# Patient Record
Sex: FEMALE | Race: WHITE | NOT HISPANIC OR LATINO | Employment: FULL TIME | URBAN - METROPOLITAN AREA
[De-identification: names, ages, dates, MRNs, and addresses within clinical notes are randomized per-mention and may not be internally consistent; named-entity substitution may affect disease eponyms.]

---

## 2017-04-19 ENCOUNTER — TELEPHONE (OUTPATIENT)
Dept: ONCOLOGY | Facility: CLINIC | Age: 47
End: 2017-04-19

## 2017-04-19 RX ORDER — LEVOTHYROXINE SODIUM 137 UG/1
137 TABLET ORAL DAILY
Qty: 30 TABLET | Refills: 1 | Status: SHIPPED | OUTPATIENT
Start: 2017-04-19 | End: 2017-04-28 | Stop reason: SDUPTHER

## 2017-04-19 NOTE — TELEPHONE ENCOUNTER
----- Message from Yaritza Gray RN sent at 4/19/2017  9:39 AM EDT -----  Please call patient and schedule her 6mo f/u with Dr. Borja, she is aware you will be calling.

## 2017-04-19 NOTE — TELEPHONE ENCOUNTER
----- Message from Jefferson Lopez sent at 4/19/2017  9:28 AM EDT -----  Contact: self   Pt is calling to get a new refill on her synthroid   [phone]    Patient states she needs a refill on synthroid.  She states we always fill it.  No mention in Dr. Borja's dictation.  Patient due for 6 mo f/u visit.  Message sent to appt desk to call and schedule her 6 mo f/u.

## 2017-04-28 ENCOUNTER — OFFICE VISIT (OUTPATIENT)
Dept: ONCOLOGY | Facility: CLINIC | Age: 47
End: 2017-04-28

## 2017-04-28 ENCOUNTER — LAB (OUTPATIENT)
Dept: LAB | Facility: HOSPITAL | Age: 47
End: 2017-04-28

## 2017-04-28 VITALS
WEIGHT: 167 LBS | SYSTOLIC BLOOD PRESSURE: 110 MMHG | TEMPERATURE: 97.8 F | DIASTOLIC BLOOD PRESSURE: 72 MMHG | HEIGHT: 65 IN | HEART RATE: 72 BPM | RESPIRATION RATE: 16 BRPM | BODY MASS INDEX: 27.82 KG/M2

## 2017-04-28 DIAGNOSIS — C85.90 NON HODGKIN'S LYMPHOMA, STAGE IA (HCC): ICD-10-CM

## 2017-04-28 DIAGNOSIS — C85.90 NON HODGKIN'S LYMPHOMA, STAGE IA (HCC): Primary | ICD-10-CM

## 2017-04-28 LAB
ALBUMIN SERPL-MCNC: 4.5 G/DL (ref 3.5–5.2)
ALBUMIN/GLOB SERPL: 1.6 G/DL (ref 1.1–2.4)
ALP SERPL-CCNC: 72 U/L (ref 38–116)
ALT SERPL W P-5'-P-CCNC: 21 U/L (ref 0–33)
ANION GAP SERPL CALCULATED.3IONS-SCNC: 11.6 MMOL/L
AST SERPL-CCNC: 28 U/L (ref 0–32)
BASOPHILS # BLD AUTO: 0.04 10*3/MM3 (ref 0–0.1)
BASOPHILS NFR BLD AUTO: 0.7 % (ref 0–1.1)
BILIRUB SERPL-MCNC: 0.2 MG/DL (ref 0.1–1.2)
BUN BLD-MCNC: 17 MG/DL (ref 6–20)
BUN/CREAT SERPL: 21.3 (ref 7.3–30)
CALCIUM SPEC-SCNC: 9.7 MG/DL (ref 8.5–10.2)
CHLORIDE SERPL-SCNC: 97 MMOL/L (ref 98–107)
CO2 SERPL-SCNC: 30.4 MMOL/L (ref 22–29)
CREAT BLD-MCNC: 0.8 MG/DL (ref 0.6–1.1)
DEPRECATED RDW RBC AUTO: 42.5 FL (ref 37–49)
EOSINOPHIL # BLD AUTO: 0.08 10*3/MM3 (ref 0–0.36)
EOSINOPHIL NFR BLD AUTO: 1.3 % (ref 1–5)
ERYTHROCYTE [DISTWIDTH] IN BLOOD BY AUTOMATED COUNT: 12.4 % (ref 11.7–14.5)
GFR SERPL CREATININE-BSD FRML MDRD: 78 ML/MIN/1.73
GLOBULIN UR ELPH-MCNC: 2.9 GM/DL (ref 1.8–3.5)
GLUCOSE BLD-MCNC: 149 MG/DL (ref 74–124)
HCT VFR BLD AUTO: 38.8 % (ref 34–45)
HGB BLD-MCNC: 12.6 G/DL (ref 11.5–14.9)
IMM GRANULOCYTES # BLD: 0.01 10*3/MM3 (ref 0–0.03)
IMM GRANULOCYTES NFR BLD: 0.2 % (ref 0–0.5)
LYMPHOCYTES # BLD AUTO: 1.51 10*3/MM3 (ref 1–3.5)
LYMPHOCYTES NFR BLD AUTO: 24.7 % (ref 20–49)
MCH RBC QN AUTO: 30.3 PG (ref 27–33)
MCHC RBC AUTO-ENTMCNC: 32.5 G/DL (ref 32–35)
MCV RBC AUTO: 93.3 FL (ref 83–97)
MONOCYTES # BLD AUTO: 0.33 10*3/MM3 (ref 0.25–0.8)
MONOCYTES NFR BLD AUTO: 5.4 % (ref 4–12)
NEUTROPHILS # BLD AUTO: 4.14 10*3/MM3 (ref 1.5–7)
NEUTROPHILS NFR BLD AUTO: 67.7 % (ref 39–75)
NRBC BLD MANUAL-RTO: 0 /100 WBC (ref 0–0)
PLATELET # BLD AUTO: 224 10*3/MM3 (ref 150–375)
PMV BLD AUTO: 9.6 FL (ref 8.9–12.1)
POTASSIUM BLD-SCNC: 4.7 MMOL/L (ref 3.5–4.7)
PROT SERPL-MCNC: 7.4 G/DL (ref 6.3–8)
RBC # BLD AUTO: 4.16 10*6/MM3 (ref 3.9–5)
SODIUM BLD-SCNC: 139 MMOL/L (ref 134–145)
WBC NRBC COR # BLD: 6.11 10*3/MM3 (ref 4–10)

## 2017-04-28 PROCEDURE — 99213 OFFICE O/P EST LOW 20 MIN: CPT | Performed by: INTERNAL MEDICINE

## 2017-04-28 PROCEDURE — 80053 COMPREHEN METABOLIC PANEL: CPT | Performed by: INTERNAL MEDICINE

## 2017-04-28 PROCEDURE — 84443 ASSAY THYROID STIM HORMONE: CPT | Performed by: INTERNAL MEDICINE

## 2017-04-28 PROCEDURE — 85025 COMPLETE CBC W/AUTO DIFF WBC: CPT | Performed by: INTERNAL MEDICINE

## 2017-04-28 PROCEDURE — 36415 COLL VENOUS BLD VENIPUNCTURE: CPT | Performed by: INTERNAL MEDICINE

## 2017-04-28 RX ORDER — LEVOTHYROXINE SODIUM 137 UG/1
137 TABLET ORAL DAILY
Qty: 90 TABLET | Refills: 3 | Status: SHIPPED | OUTPATIENT
Start: 2017-04-28 | End: 2018-05-10 | Stop reason: SDUPTHER

## 2017-04-28 NOTE — PROGRESS NOTES
Subjective .  No additional symptoms, normal performance status    REASONS FOR FOLLOWUP:   1. 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   2. Recurrent disease, involving the left thyroid, biopsy-proven, PET scan reported hypermetabolism with left thyroid gland as well as a right neck lymph node.   3. Start chemotherapy with Treanda plus Rituxan on 03/21/2011. Had reaction to Rituxan infusion with significant skin rashes.   4. The patient [facility], doing well, with additional Treanda/Rituxan chemotherapy already planned.   5. The patient presents for a second cycle of Treanda/Rituxan with Rituxan moved to third day, followup PET planned.   6. Patient seen back post [place] review, 4-6 cycles Treanda/Rituxan planned, radiotherapy reconsultation planned, cycle #4 Treanda/Rituxan to be given with Treanda/Rituxan second day provided.   7. Followup review via [facility] er. Total of 6 cycles of planned Rituxan followed by consultation for radiotherapy and/or surgery. Followup thereafter without transplantation.   8. Patient seen 12/20/2011 with normal scans, followup reassessments planned. Note - a total of 5 courses of c hemotherapy were given.   9. Patient with followup studies performed on 04/17/2012; no evidence of recurrent disease.   10. CT scans 09/11/2012 with slight interval increase in sub-pathologic lymph nodes of the neck; 3-month interval scans planned.   11. Followup scans on 12/04/2012 showed no evidence of progressive disease, followup assessment in 6 months planned.   12. Followup scans in June 2013 without evidence of recurrent disease, increasing hot flashes noted, vitamin E trial advocated versus trial of Effexor, followup scans planned at 1 year with a 6-month review as well.   13. The patient seen 11/20/2013, stable with evidence of  perimenopause. Followup scans scheduled at 6 months, gynecologic assessment pursued.   14. Patient seen per GYN - postmenopausal status, followup sca ns without evidence of recurrent disease at 3 years, every-6-month followups through 5 years planned.   15. Patient status post ENT assessment with Dr. Jasen Dela Cruz with new neck node 10/13/2014, needle biopsy inconclusive, node removal consistent with reactive node only, 10/27/2014; assessments planned through our office thereafter.   16. Patient seen in office 07/08/2015, stable without any evidence of recurrent disease, every-6-month followup planned through year 5.   17. Patient seen in office 02/17/2016, stable; followup laboratory studies anticipated additional 6 months through the 5-year daisy; thyroid medication refilled; pneumococcal vaccine PPSV23 provided.   18. Patient seen August 03, 2016, stable clinically, laboratory studies-normal, Prevnar 13 provided  19. Patient reviewed April 28, 2017, stable without any evidence recurrence, yearly follow-up planned, thyroid medication refilled    History of Present Illness       The patient is a 46-year-old female with a history of recurrent non-Hodgkin lymphoma, status post therapy with Treanda/Rituxan x5 cycles radiation therapy, followed by observation. As she is seen back at 3 years, followup scans have now been done, which fortunately show no evidence o f recurrent disease. Recent CT of the neck, chest, abdomen and pelvis show a few sub centimeter neck nodes that are non-pathologically enlarged. As the patient is seen back, she had been last reviewed 11/20/2013, again, generally doing well. She had bee n developing perimenopausal symptoms and was referred back to her gynecologist, Dr. Russo.   As the patient was seen 05/07/2014, she was postmenopausal, handling this well, following with her gynecologist. Plans were made to follow again at a 6-month interv al. We were notified by her ENT physician that she had  developed a mid-cervical node 10/13/2014. This had been several cm in size and needle biopsy was done and found to be inconclusive. He contacted our practice and we requested removal of the node. Final path was consistent with reactive findings only.   As the patient returns today, 12/12/2014, she has not had any systemic symptoms and feels well overall, though one remains concerned about recurrent lymphoma.   As the patient was asked to be re-reviewed again, she presents now 07/08/2015. She is doing well without any additional issues since last reviewed.   The patient is now seen back 02/17/2016, feeling well overall. She has had review by ENT when there was thought to be a nodularity in her thyr oid, but this evidently did not turn out to be a serious problem. As she is seen today, she again has excellent performance status and no other new findings. We have discussed that she should obtain a flu shot and reviewed the pneumococcal vaccine as nury sagastume, hoping to provide PPSV23 here in our office today, and as she returns, Prevnar 13. We are hoping to provide PCV13.   The patient is next seen August 03, 2016.  She is just returned from a [place] trip with her family states that she is felt well with normal stamina, no fevers chills night sweats or other systemic symptoms.  Additionally she's had no development any thrombotic complications.    The patient is next seen April 28, 2017.  She continues to do well overall with excellent performance status.  She's had no additional medical issues since last seen and indicates that she does not have a primary care physician.  We plan to continue to see her yearly.  Past Medical History:   Diagnosis Date   • Antiphospholipid antibody syndrome 2000   • Endometriosis 2000   • Lymphoma of thyroid gland     Stage IE large B cell non-Hodgkin's lymphoma       ONCOLOGIC HISTORY:  (History from previous dates can be found in the separate document.)    Current Outpatient Prescriptions  "on File Prior to Visit   Medication Sig Dispense Refill   • Cholecalciferol (D3 ADULT PO) Take 2,000 Units by mouth.     • levothyroxine (SYNTHROID, LEVOTHROID) 137 MCG tablet Take 1 tablet by mouth Daily. 30 tablet 1   • VAGIFEM 10 MCG tablet vaginal tablet USE VAGINALLY 2-3 TIMES PER WEEK  5   • [DISCONTINUED] levothyroxine (SYNTHROID, LEVOTHROID) 137 MCG tablet Take  by mouth.       No current facility-administered medications on file prior to visit.        ALLERGIES:     Allergies   Allergen Reactions   • Morphine And Related    • Morphine Sulfate        Social History     Social History   • Marital status:      Spouse name: Aris   • Number of children: N/A   • Years of education: High School     Occupational History   •       Social History Main Topics   • Smoking status: Former Smoker     Packs/day: 1.00     Years: 17.00     Types: Cigarettes   • Smokeless tobacco: Not on file   • Alcohol use Yes      Comment: Occasional   • Drug use: No   • Sexual activity: Not on file     Other Topics Concern   • Not on file     Social History Narrative         Cancer-related family history includes Cancer in her maternal grandmother and other.     Review of Systems  A comprehensive 14 point review of systems was performed and was negative except as mentioned.    Objective      Vitals:    04/28/17 1604   BP: 110/72   Pulse: 72   Resp: 16   Temp: 97.8 °F (36.6 °C)   Weight: 167 lb (75.8 kg)   Height: 65.35\" (166 cm)  Comment: new ht.   PainSc: 0-No pain     Current Status 4/28/2017   ECOG score 0       Physical Exam    GENERAL: Well-developed, well-nourished female in no acute distress.   SKIN: Warm, dry without rashes, purpura or petechiae.   HEAD: Normocephalic.   EYES: Pupils equal, round and reactive to light. EOMs intact. Conjunctivae normal.   EARS: Hearing intact.   NOSE: Septum midline. No excoriations or nasal discharge.   MOUTH: Tongue is well-papillated; no stomatitis or ulcers. Lips normal. "   THROAT: Oropharynx without lesions or exudates.   NECK: Supple with good range of motion; no thyromegaly or masses, no JVD or bruits.   LYMPHATICS: No cervical, supraclavicular, axillary or inguinal adenopathy.   CHEST: Lungs clear to percussion and auscultation.   CARDIAC: Regular rate and rhythm without murmurs, rubs or gallops.   ABDOMEN: Soft, nontender with no organomegaly or masses.   EXTREMITIES: No clubbing, cyanosis or edema.   NEUROLOGICAL: No focal neurological deficits.     RECENT LABS:  Hematology WBC   Date Value Ref Range Status   04/28/2017 6.11 4.00 - 10.00 10*3/mm3 Final     RBC   Date Value Ref Range Status   04/28/2017 4.16 3.90 - 5.00 10*6/mm3 Final     Hemoglobin   Date Value Ref Range Status   04/28/2017 12.6 11.5 - 14.9 g/dL Final     Hematocrit   Date Value Ref Range Status   04/28/2017 38.8 34.0 - 45.0 % Final     Platelets   Date Value Ref Range Status   04/28/2017 224 150 - 375 10*3/mm3 Final        Assessment/Plan   1. This is a 46-year-old female with a history of recurrent non-Hodgkin lymphoma, status post therapy with Treanda/Rituxan x5 cycles and radiation therapy followed by observation. At approximately 5 years out from radiation therapy, she remains without evidence recurrent disease. We will plan to assess her in 12 months, at year 6, again with laboratory studies including CBC, CMP, sedimentation rate, lipid profile and TSH.

## 2017-05-01 LAB — TSH SERPL DL<=0.05 MIU/L-ACNC: 0.95 MIU/ML (ref 0.27–4.2)

## 2018-04-30 ENCOUNTER — OFFICE VISIT (OUTPATIENT)
Dept: ONCOLOGY | Facility: CLINIC | Age: 48
End: 2018-04-30

## 2018-04-30 ENCOUNTER — LAB (OUTPATIENT)
Dept: LAB | Facility: HOSPITAL | Age: 48
End: 2018-04-30

## 2018-04-30 VITALS
HEIGHT: 65 IN | TEMPERATURE: 97.9 F | RESPIRATION RATE: 16 BRPM | HEART RATE: 64 BPM | OXYGEN SATURATION: 100 % | BODY MASS INDEX: 28.49 KG/M2 | SYSTOLIC BLOOD PRESSURE: 112 MMHG | WEIGHT: 171 LBS | DIASTOLIC BLOOD PRESSURE: 64 MMHG

## 2018-04-30 DIAGNOSIS — C85.90 NON HODGKIN'S LYMPHOMA, STAGE IA (HCC): Primary | ICD-10-CM

## 2018-04-30 LAB
ALBUMIN SERPL-MCNC: 4.7 G/DL (ref 3.5–5.2)
ALBUMIN/GLOB SERPL: 1.4 G/DL (ref 1.1–2.4)
ALP SERPL-CCNC: 69 U/L (ref 38–116)
ALT SERPL W P-5'-P-CCNC: 21 U/L (ref 0–33)
ANION GAP SERPL CALCULATED.3IONS-SCNC: 9.9 MMOL/L
AST SERPL-CCNC: 30 U/L (ref 0–32)
BASOPHILS # BLD AUTO: 0.04 10*3/MM3 (ref 0–0.1)
BASOPHILS NFR BLD AUTO: 0.7 % (ref 0–1.1)
BILIRUB SERPL-MCNC: 0.2 MG/DL (ref 0.1–1.2)
BUN BLD-MCNC: 15 MG/DL (ref 6–20)
BUN/CREAT SERPL: 17.6 (ref 7.3–30)
CALCIUM SPEC-SCNC: 10.1 MG/DL (ref 8.5–10.2)
CHLORIDE SERPL-SCNC: 99 MMOL/L (ref 98–107)
CHOLEST SERPL-MCNC: 200 MG/DL (ref 0–200)
CO2 SERPL-SCNC: 32.1 MMOL/L (ref 22–29)
CREAT BLD-MCNC: 0.85 MG/DL (ref 0.6–1.1)
DEPRECATED RDW RBC AUTO: 42.9 FL (ref 37–49)
EOSINOPHIL # BLD AUTO: 0.08 10*3/MM3 (ref 0–0.36)
EOSINOPHIL NFR BLD AUTO: 1.4 % (ref 1–5)
ERYTHROCYTE [DISTWIDTH] IN BLOOD BY AUTOMATED COUNT: 12.7 % (ref 11.7–14.5)
ERYTHROCYTE [SEDIMENTATION RATE] IN BLOOD: 8 MM/HR (ref 0–20)
GFR SERPL CREATININE-BSD FRML MDRD: 72 ML/MIN/1.73
GLOBULIN UR ELPH-MCNC: 3.3 GM/DL (ref 1.8–3.5)
GLUCOSE BLD-MCNC: 87 MG/DL (ref 74–124)
HCT VFR BLD AUTO: 38.8 % (ref 34–45)
HDLC SERPL-MCNC: 117 MG/DL (ref 40–60)
HGB BLD-MCNC: 12.5 G/DL (ref 11.5–14.9)
IMM GRANULOCYTES # BLD: 0.03 10*3/MM3 (ref 0–0.03)
IMM GRANULOCYTES NFR BLD: 0.5 % (ref 0–0.5)
LDH SERPL-CCNC: 186 U/L (ref 99–259)
LDLC SERPL CALC-MCNC: 77 MG/DL (ref 0–100)
LDLC/HDLC SERPL: 0.66 {RATIO}
LYMPHOCYTES # BLD AUTO: 1.44 10*3/MM3 (ref 1–3.5)
LYMPHOCYTES NFR BLD AUTO: 25.4 % (ref 20–49)
MCH RBC QN AUTO: 29.9 PG (ref 27–33)
MCHC RBC AUTO-ENTMCNC: 32.2 G/DL (ref 32–35)
MCV RBC AUTO: 92.8 FL (ref 83–97)
MONOCYTES # BLD AUTO: 0.36 10*3/MM3 (ref 0.25–0.8)
MONOCYTES NFR BLD AUTO: 6.3 % (ref 4–12)
NEUTROPHILS # BLD AUTO: 3.73 10*3/MM3 (ref 1.5–7)
NEUTROPHILS NFR BLD AUTO: 65.7 % (ref 39–75)
NRBC BLD MANUAL-RTO: 0 /100 WBC (ref 0–0)
PLATELET # BLD AUTO: 250 10*3/MM3 (ref 150–375)
PMV BLD AUTO: 9.1 FL (ref 8.9–12.1)
POTASSIUM BLD-SCNC: 4.7 MMOL/L (ref 3.5–4.7)
PROT SERPL-MCNC: 8 G/DL (ref 6.3–8)
RBC # BLD AUTO: 4.18 10*6/MM3 (ref 3.9–5)
SODIUM BLD-SCNC: 141 MMOL/L (ref 134–145)
T4 FREE SERPL-MCNC: 1.43 NG/DL (ref 0.93–1.7)
TRIGL SERPL-MCNC: 29 MG/DL (ref 0–150)
TSH SERPL DL<=0.05 MIU/L-ACNC: 2.8 MIU/ML (ref 0.27–4.2)
VLDLC SERPL-MCNC: 5.8 MG/DL (ref 5–40)
WBC NRBC COR # BLD: 5.68 10*3/MM3 (ref 4–10)

## 2018-04-30 PROCEDURE — 80061 LIPID PANEL: CPT | Performed by: INTERNAL MEDICINE

## 2018-04-30 PROCEDURE — 85652 RBC SED RATE AUTOMATED: CPT | Performed by: INTERNAL MEDICINE

## 2018-04-30 PROCEDURE — 84439 ASSAY OF FREE THYROXINE: CPT | Performed by: INTERNAL MEDICINE

## 2018-04-30 PROCEDURE — 99213 OFFICE O/P EST LOW 20 MIN: CPT | Performed by: INTERNAL MEDICINE

## 2018-04-30 PROCEDURE — 85025 COMPLETE CBC W/AUTO DIFF WBC: CPT

## 2018-04-30 PROCEDURE — 83615 LACTATE (LD) (LDH) ENZYME: CPT

## 2018-04-30 PROCEDURE — 80053 COMPREHEN METABOLIC PANEL: CPT

## 2018-04-30 PROCEDURE — 84443 ASSAY THYROID STIM HORMONE: CPT | Performed by: INTERNAL MEDICINE

## 2018-04-30 PROCEDURE — 36415 COLL VENOUS BLD VENIPUNCTURE: CPT | Performed by: INTERNAL MEDICINE

## 2018-04-30 NOTE — PROGRESS NOTES
Subjective .  Again no additional symptoms, normal performance status    REASONS FOR FOLLOWUP:   1. 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   2. Recurrent disease, involving the left thyroid, biopsy-proven, PET scan reported hypermetabolism with left thyroid gland as well as a right neck lymph node.   3. Start chemotherapy with Treanda plus Rituxan on 03/21/2011. Had reaction to Rituxan infusion with significant skin rashes.   4. The [facility], doing well, with additional Treanda/Rituxan chemotherapy already planned.   5. The patient presents for a second cycle of Treanda/Rituxan with Rituxan moved to third day, followup PET planned.   6. Patient seen back post [place] review, 4-6 cycles Treanda/Rituxan planned, radiotherapy reconsultation planned, cycle #4 Treanda/Rituxan to be given with Treanda/Rituxan second day provided.   7. Followup review via [facility] er. Total of 6 cycles of planned Rituxan followed by consultation for radiotherapy and/or surgery. Followup thereafter without transplantation.   8. Patient seen 12/20/2011 with normal scans, followup reassessments planned. Note - a total of 5 courses of c hemotherapy were given.   9. Patient with followup studies performed on 04/17/2012; no evidence of recurrent disease.   10. CT scans 09/11/2012 with slight interval increase in sub-pathologic lymph nodes of the neck; 3-month interval scans planned.   11. Followup scans on 12/04/2012 showed no evidence of progressive disease, followup assessment in 6 months planned.   12. Followup scans in June 2013 without evidence of recurrent disease, increasing hot flashes noted, vitamin E trial advocated versus trial of Effexor, followup scans planned at 1 year with a 6-month review as well.   13. The patient seen 11/20/2013, stable with evidence  of perimenopause. Followup scans scheduled at 6 months, gynecologic assessment pursued.   14. Patient seen per GYN - postmenopausal status, followup sca ns without evidence of recurrent disease at 3 years, every-6-month followups through 5 years planned.   15. Patient status post ENT assessment with Dr. Jasen Dela Cruz with new neck node 10/13/2014, needle biopsy inconclusive, node removal consistent with reactive node only, 10/27/2014; assessments planned through our office thereafter.   16. Patient seen in office 07/08/2015, stable without any evidence of recurrent disease, every-6-month followup planned through year 5.   17. Patient seen in office 02/17/2016, stable; followup laboratory studies anticipated additional 6 months through the 5-year daisy; thyroid medication refilled; pneumococcal vaccine PPSV23 provided.   18. Patient seen August 03, 2016, stable clinically, laboratory studies-normal, Prevnar 13 provided  19. Patient reviewed April 28, 2017, stable without any evidence recurrence, yearly follow-up planned, thyroid medication refilled  20. Patient reviewed April 30, 2018, excellent performance status    History of Present Illness       The patient is a 47-year-old female with a history of recurrent non-Hodgkin lymphoma, status post therapy with Treanda/Rituxan x5 cycles radiation therapy, followed by observation. As she is seen back at 3 years, followup scans have now been done, which fortunately show no evidence o f recurrent disease. Recent CT of the neck, chest, abdomen and pelvis show a few sub centimeter neck nodes that are non-pathologically enlarged. As the patient is seen back, she had been last reviewed 11/20/2013, again, generally doing well. She had bee n developing perimenopausal symptoms and was referred back to her gynecologist, Dr. Russo.   As the patient was seen 05/07/2014, she was postmenopausal, handling this well, following with her gynecologist. Plans were made to follow again at a  6-month interv al. We were notified by her ENT physician that she had developed a mid-cervical node 10/13/2014. This had been several cm in size and needle biopsy was done and found to be inconclusive. He contacted our practice and we requested removal of the node. Final path was consistent with reactive findings only.   As the patient returns today, 12/12/2014, she has not had any systemic symptoms and feels well overall, though one remains concerned about recurrent lymphoma.   As the patient was asked to be re-reviewed again, she presents now 07/08/2015. She is doing well without any additional issues since last reviewed.   The patient is now seen back 02/17/2016, feeling well overall. She has had review by ENT when there was thought to be a nodularity in her thyr oid, but this evidently did not turn out to be a serious problem. As she is seen today, she again has excellent performance status and no other new findings. We have discussed that she should obtain a flu shot and reviewed the pneumococcal vaccine as w tanika, hoping to provide PPSV23 here in our office today, and as she returns, Prevnar 13. We are hoping to provide PCV13.   The patient is next seen August 03, 2016.  She is just returned from a [place] trip with her family states that she is felt well with normal stamina, no fevers chills night sweats or other systemic symptoms.  Additionally she's had no development any thrombotic complications.    The patient is next seen April 28, 2017.  She continues to do well overall with excellent performance status.  She's had no additional medical issues since last seen and indicates that she does not have a primary care physician.  We plan to continue to see her yearly.    The patient is next seen April 30, 2018.  She has done very well over the last year with no additional medical issues.  Past Medical History:   Diagnosis Date   • Antiphospholipid antibody syndrome 2000   • Endometriosis 2000   • Lymphoma of  "thyroid gland     Stage IE large B cell non-Hodgkin's lymphoma       ONCOLOGIC HISTORY:  (History from previous dates can be found in the separate document.)    Current Outpatient Prescriptions on File Prior to Visit   Medication Sig Dispense Refill   • Cholecalciferol (D3 ADULT PO) Take 2,000 Units by mouth.     • levothyroxine (SYNTHROID, LEVOTHROID) 137 MCG tablet Take 1 tablet by mouth Daily. 90 tablet 3   • VAGIFEM 10 MCG tablet vaginal tablet USE VAGINALLY 2-3 TIMES PER WEEK  5     No current facility-administered medications on file prior to visit.        ALLERGIES:     Allergies   Allergen Reactions   • Morphine And Related        Social History     Social History   • Marital status:      Spouse name: Aris   • Number of children: N/A   • Years of education: High School     Occupational History   •       Social History Main Topics   • Smoking status: Former Smoker     Packs/day: 1.00     Years: 17.00     Types: Cigarettes   • Smokeless tobacco: Not on file   • Alcohol use Yes      Comment: Occasional   • Drug use: No   • Sexual activity: Not on file     Other Topics Concern   • Not on file     Social History Narrative   • No narrative on file         Cancer-related family history includes Cancer in her maternal grandmother and other.     Review of Systems  A comprehensive 14 point review of systems was performed and was negative except as mentioned.    Objective      Vitals:    04/30/18 1409   BP: 112/64   Pulse: 64   Resp: 16   Temp: 97.9 °F (36.6 °C)   TempSrc: Oral   SpO2: 100%   Weight: 77.6 kg (171 lb)   Height: 166 cm (65.35\")   PainSc: 0-No pain     Current Status 4/30/2018   ECOG score 0       Physical Exam    GENERAL: Well-developed, well-nourished female in no acute distress.   SKIN: Warm, dry without rashes, purpura or petechiae.   HEAD: Normocephalic.   EYES: Pupils equal, round and reactive to light. EOMs intact. Conjunctivae normal.   EARS: Hearing intact.   NOSE: Septum " midline. No excoriations or nasal discharge.   MOUTH: Tongue is well-papillated; no stomatitis or ulcers. Lips normal.   THROAT: Oropharynx without lesions or exudates.   NECK: Supple with good range of motion; no thyromegaly or masses, no JVD or bruits.   LYMPHATICS: No cervical, supraclavicular, axillary or inguinal adenopathy.   CHEST: Lungs clear to percussion and auscultation.   CARDIAC: Regular rate and rhythm without murmurs, rubs or gallops.   ABDOMEN: Soft, nontender with no organomegaly or masses.   EXTREMITIES: No clubbing, cyanosis or edema.   NEUROLOGICAL: No focal neurological deficits.     RECENT LABS:  Hematology WBC   Date Value Ref Range Status   04/30/2018 5.68 4.00 - 10.00 10*3/mm3 Final     RBC   Date Value Ref Range Status   04/30/2018 4.18 3.90 - 5.00 10*6/mm3 Final     Hemoglobin   Date Value Ref Range Status   04/30/2018 12.5 11.5 - 14.9 g/dL Final     Hematocrit   Date Value Ref Range Status   04/30/2018 38.8 34.0 - 45.0 % Final     Platelets   Date Value Ref Range Status   04/30/2018 250 150 - 375 10*3/mm3 Final        Assessment/Plan   1. This is a 47-year-old female with a history of recurrent non-Hodgkin lymphoma, status post therapy with Treanda/Rituxan x5 cycles and radiation therapy followed by observation. At approximately 5 years out from radiation therapy, she remains without evidence recurrent disease. We will plan to assess her in 12 months, at year 7, again with laboratory studies including CBC, CMP, sedimentation rate, lipid profile and TSH.      Addendum-message left for patient on answering machine concerning normal laboratory testing.  I have asked her to call back for any questions.

## 2018-05-10 RX ORDER — LEVOTHYROXINE SODIUM 137 UG/1
TABLET ORAL
Qty: 90 TABLET | Refills: 1 | Status: SHIPPED | OUTPATIENT
Start: 2018-05-10 | End: 2018-09-28 | Stop reason: SDUPTHER

## 2018-09-28 ENCOUNTER — TELEPHONE (OUTPATIENT)
Dept: ONCOLOGY | Facility: HOSPITAL | Age: 48
End: 2018-09-28

## 2018-09-28 RX ORDER — LEVOTHYROXINE SODIUM 137 UG/1
137 TABLET ORAL DAILY
Qty: 90 TABLET | Refills: 0 | Status: SHIPPED | OUTPATIENT
Start: 2018-09-28 | End: 2019-02-10 | Stop reason: SDUPTHER

## 2018-09-28 NOTE — TELEPHONE ENCOUNTER
----- Message from Kaya Dwyer sent at 9/28/2018  9:42 AM EDT -----  Contact: [phone]  Pt needs a refill on Levothyrodine 137mcg called to [facility] at [id number] [facility].      Pt called wanting to get a refill on Levothyroxine. Escribed to her pharmacy.

## 2019-02-11 RX ORDER — LEVOTHYROXINE SODIUM 137 UG/1
TABLET ORAL
Qty: 90 TABLET | Refills: 0 | Status: SHIPPED | OUTPATIENT
Start: 2019-02-11 | End: 2019-05-09 | Stop reason: SDUPTHER

## 2019-05-02 ENCOUNTER — LAB (OUTPATIENT)
Dept: LAB | Facility: HOSPITAL | Age: 49
End: 2019-05-02

## 2019-05-02 DIAGNOSIS — C85.90 NON HODGKIN'S LYMPHOMA, STAGE IA (HCC): Primary | ICD-10-CM

## 2019-05-02 LAB
ALBUMIN SERPL-MCNC: 4.5 G/DL (ref 3.5–5.2)
ALBUMIN/GLOB SERPL: 1.4 G/DL (ref 1.1–2.4)
ALP SERPL-CCNC: 71 U/L (ref 38–116)
ALT SERPL W P-5'-P-CCNC: 19 U/L (ref 0–33)
ANION GAP SERPL CALCULATED.3IONS-SCNC: 13.7 MMOL/L
AST SERPL-CCNC: 29 U/L (ref 0–32)
BASOPHILS # BLD AUTO: 0.05 10*3/MM3 (ref 0–0.2)
BASOPHILS NFR BLD AUTO: 0.9 % (ref 0–1.5)
BILIRUB SERPL-MCNC: 0.3 MG/DL (ref 0.2–1.2)
BUN BLD-MCNC: 21 MG/DL (ref 6–20)
BUN/CREAT SERPL: 24.4 (ref 7.3–30)
CALCIUM SPEC-SCNC: 10.3 MG/DL (ref 8.5–10.2)
CHLORIDE SERPL-SCNC: 101 MMOL/L (ref 98–107)
CHOLEST SERPL-MCNC: 220 MG/DL (ref 0–200)
CO2 SERPL-SCNC: 25.3 MMOL/L (ref 22–29)
CREAT BLD-MCNC: 0.86 MG/DL (ref 0.6–1.1)
DEPRECATED RDW RBC AUTO: 43.1 FL (ref 37–54)
EOSINOPHIL # BLD AUTO: 0.09 10*3/MM3 (ref 0–0.4)
EOSINOPHIL NFR BLD AUTO: 1.6 % (ref 0.3–6.2)
ERYTHROCYTE [DISTWIDTH] IN BLOOD BY AUTOMATED COUNT: 12.6 % (ref 12.3–15.4)
ERYTHROCYTE [SEDIMENTATION RATE] IN BLOOD: 9 MM/HR (ref 0–20)
GFR SERPL CREATININE-BSD FRML MDRD: 71 ML/MIN/1.73
GLOBULIN UR ELPH-MCNC: 3.2 GM/DL (ref 1.8–3.5)
GLUCOSE BLD-MCNC: 82 MG/DL (ref 74–124)
HCT VFR BLD AUTO: 42.9 % (ref 34–46.6)
HDLC SERPL-MCNC: 130 MG/DL (ref 40–60)
HGB BLD-MCNC: 13.8 G/DL (ref 12–15.9)
IMM GRANULOCYTES # BLD AUTO: 0.02 10*3/MM3 (ref 0–0.05)
IMM GRANULOCYTES NFR BLD AUTO: 0.4 % (ref 0–0.5)
LDLC SERPL CALC-MCNC: 84 MG/DL (ref 0–100)
LDLC/HDLC SERPL: 0.64 {RATIO}
LYMPHOCYTES # BLD AUTO: 1.4 10*3/MM3 (ref 0.7–3.1)
LYMPHOCYTES NFR BLD AUTO: 25.3 % (ref 19.6–45.3)
MCH RBC QN AUTO: 29.9 PG (ref 26.6–33)
MCHC RBC AUTO-ENTMCNC: 32.2 G/DL (ref 31.5–35.7)
MCV RBC AUTO: 92.9 FL (ref 79–97)
MONOCYTES # BLD AUTO: 0.45 10*3/MM3 (ref 0.1–0.9)
MONOCYTES NFR BLD AUTO: 8.1 % (ref 5–12)
NEUTROPHILS # BLD AUTO: 3.52 10*3/MM3 (ref 1.7–7)
NEUTROPHILS NFR BLD AUTO: 63.7 % (ref 42.7–76)
NRBC BLD AUTO-RTO: 0 /100 WBC (ref 0–0.2)
PLATELET # BLD AUTO: 240 10*3/MM3 (ref 140–450)
PMV BLD AUTO: 9.2 FL (ref 6–12)
POTASSIUM BLD-SCNC: 4.7 MMOL/L (ref 3.5–4.7)
PROT SERPL-MCNC: 7.7 G/DL (ref 6.3–8)
RBC # BLD AUTO: 4.62 10*6/MM3 (ref 3.77–5.28)
SODIUM BLD-SCNC: 140 MMOL/L (ref 134–145)
TRIGL SERPL-MCNC: 31 MG/DL (ref 0–150)
TSH SERPL DL<=0.05 MIU/L-ACNC: 5.35 MIU/ML (ref 0.27–4.2)
VLDLC SERPL-MCNC: 6.2 MG/DL (ref 5–40)
WBC NRBC COR # BLD: 5.53 10*3/MM3 (ref 3.4–10.8)

## 2019-05-02 PROCEDURE — 85652 RBC SED RATE AUTOMATED: CPT | Performed by: INTERNAL MEDICINE

## 2019-05-02 PROCEDURE — 36415 COLL VENOUS BLD VENIPUNCTURE: CPT | Performed by: INTERNAL MEDICINE

## 2019-05-02 PROCEDURE — 80061 LIPID PANEL: CPT | Performed by: INTERNAL MEDICINE

## 2019-05-02 PROCEDURE — 84443 ASSAY THYROID STIM HORMONE: CPT | Performed by: INTERNAL MEDICINE

## 2019-05-02 PROCEDURE — 80053 COMPREHEN METABOLIC PANEL: CPT | Performed by: INTERNAL MEDICINE

## 2019-05-02 PROCEDURE — 85025 COMPLETE CBC W/AUTO DIFF WBC: CPT | Performed by: INTERNAL MEDICINE

## 2019-05-09 ENCOUNTER — OFFICE VISIT (OUTPATIENT)
Dept: ONCOLOGY | Facility: CLINIC | Age: 49
End: 2019-05-09

## 2019-05-09 ENCOUNTER — APPOINTMENT (OUTPATIENT)
Dept: ONCOLOGY | Facility: CLINIC | Age: 49
End: 2019-05-09

## 2019-05-09 ENCOUNTER — APPOINTMENT (OUTPATIENT)
Dept: LAB | Facility: HOSPITAL | Age: 49
End: 2019-05-09

## 2019-05-09 VITALS
BODY MASS INDEX: 29.82 KG/M2 | RESPIRATION RATE: 18 BRPM | HEIGHT: 65 IN | DIASTOLIC BLOOD PRESSURE: 77 MMHG | OXYGEN SATURATION: 99 % | SYSTOLIC BLOOD PRESSURE: 123 MMHG | HEART RATE: 64 BPM | WEIGHT: 179 LBS | TEMPERATURE: 98.3 F

## 2019-05-09 DIAGNOSIS — C85.90 NON HODGKIN'S LYMPHOMA, STAGE IA (HCC): Primary | ICD-10-CM

## 2019-05-09 PROCEDURE — 99213 OFFICE O/P EST LOW 20 MIN: CPT | Performed by: INTERNAL MEDICINE

## 2019-05-09 RX ORDER — LEVOTHYROXINE SODIUM 0.15 MG/1
150 TABLET ORAL DAILY
Qty: 90 TABLET | Refills: 3 | Status: SHIPPED | OUTPATIENT
Start: 2019-05-09 | End: 2020-05-27

## 2019-05-09 NOTE — PROGRESS NOTES
Subjective .  Again no additional symptoms, normal performance status    REASONS FOR FOLLOWUP:   1. 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   2. Recurrent disease, involving the left thyroid, biopsy-proven, PET scan reported hypermetabolism with left thyroid gland as well as a right neck lymph node.   3. Start chemotherapy with Treanda plus Rituxan on 03/21/2011. Had reaction to Rituxan infusion with significant skin rashes.   4. The patient [facility], doing well, with additional Treanda/Rituxan chemotherapy already planned.   5. The patient presents for a second cycle of Treanda/Rituxan with Rituxan moved to third day, followup PET planned.   6. Patient seen back post [place] review, 4-6 cycles Treanda/Rituxan planned, radiotherapy reconsultation planned, cycle #4 Treanda/Rituxan to be given with Treanda/Rituxan second day provided.   7. Followup review via [facility] er. Total of 6 cycles of planned Rituxan followed by consultation for radiotherapy and/or surgery. Followup thereafter without transplantation.   8. Patient seen 12/20/2011 with normal scans, followup reassessments planned. Note - a total of 5 courses of c hemotherapy were given.   9. Patient with followup studies performed on 04/17/2012; no evidence of recurrent disease.   10. CT scans 09/11/2012 with slight interval increase in sub-pathologic lymph nodes of the neck; 3-month interval scans planned.   11. Followup scans on 12/04/2012 showed no evidence of progressive disease, followup assessment in 6 months planned.   12. Followup scans in June 2013 without evidence of recurrent disease, increasing hot flashes noted, vitamin E trial advocated versus trial of Effexor, followup scans planned at 1 year with a 6-month review as well.   13. The patient seen 11/20/2013, stable with evidence  of perimenopause. Followup scans scheduled at 6 months, gynecologic assessment pursued.   14. Patient seen per GYN - postmenopausal status, followup sca ns without evidence of recurrent disease at 3 years, every-6-month followups through 5 years planned.   15. Patient status post ENT assessment with Dr. Jasen Dela Cruz with new neck node 10/13/2014, needle biopsy inconclusive, node removal consistent with reactive node only, 10/27/2014; assessments planned through our office thereafter.   16. Patient seen in office 07/08/2015, stable without any evidence of recurrent disease, every-6-month followup planned through year 5.   17. Patient seen in office 02/17/2016, stable; followup laboratory studies anticipated additional 6 months through the 5-year daisy; thyroid medication refilled; pneumococcal vaccine PPSV23 provided.   18. Patient seen August 03, 2016, stable clinically, laboratory studies-normal, Prevnar 13 provided  19. Patient reviewed April 28, 2017, stable without any evidence recurrence, yearly follow-up planned, thyroid medication refilled  20. Patient reviewed April 30, 2018, excellent performance status  21. Patient reviewed May 9, 2019 stable, adjustment of Synthroid    History of Present Illness       The patient is a 48 year-old female with a history of recurrent non-Hodgkin lymphoma, status post therapy with Treanda/Rituxan x5 cycles radiation therapy, followed by observation. As she is seen back at 3 years, followup scans have now been done, which fortunately show no evidence o f recurrent disease. Recent CT of the neck, chest, abdomen and pelvis show a few sub centimeter neck nodes that are non-pathologically enlarged. As the patient is seen back, she had been last reviewed 11/20/2013, again, generally doing well. She had bee n developing perimenopausal symptoms and was referred back to her gynecologist, Dr. Russo.   As the patient was seen 05/07/2014, she was postmenopausal, handling this well,  following with her gynecologist. Plans were made to follow again at a 6-month interv al. We were notified by her ENT physician that she had developed a mid-cervical node 10/13/2014. This had been several cm in size and needle biopsy was done and found to be inconclusive. He contacted our practice and we requested removal of the node. Final path was consistent with reactive findings only.   As the patient returns today, 12/12/2014, she has not had any systemic symptoms and feels well overall, though one remains concerned about recurrent lymphoma.   As the patient was asked to be re-reviewed again, she presents now 07/08/2015. She is doing well without any additional issues since last reviewed.   The patient is now seen back 02/17/2016, feeling well overall. She has had review by ENT when there was thought to be a nodularity in her thyr oid, but this evidently did not turn out to be a serious problem. As she is seen today, she again has excellent performance status and no other new findings. We have discussed that she should obtain a flu shot and reviewed the pneumococcal vaccine as w tanika, hoping to provide PPSV23 here in our office today, and as she returns, Prevnar 13. We are hoping to provide PCV13.   The patient is next seen August 03, 2016.  She is just returned from a [place] trip with her family states that she is felt well with normal stamina, no fevers chills night sweats or other systemic symptoms.  Additionally she's had no development any thrombotic complications.    The patient is next seen April 28, 2017.  She continues to do well overall with excellent performance status.  She's had no additional medical issues since last seen and indicates that she does not have a primary care physician.  We plan to continue to see her yearly.    The patient is next seen April 30, 2018.  She has done very well over the last year with no additional medical issues.  The patient is next seen May 9, 2019 continue to do  "well overall.  She has not yet been able to find primary care.  We continue to assess her yearly.  Past Medical History:   Diagnosis Date   • Antiphospholipid antibody syndrome (CMS/HCC) 2000   • Endometriosis 2000   • Lymphoma of thyroid gland (CMS/HCC)     Stage IE large B cell non-Hodgkin's lymphoma       ONCOLOGIC HISTORY:  (History from previous dates can be found in the separate document.)    Current Outpatient Medications on File Prior to Visit   Medication Sig Dispense Refill   • Cholecalciferol (D3 ADULT PO) Take 2,000 Units by mouth.     • levothyroxine (SYNTHROID, LEVOTHROID) 137 MCG tablet TAKE ONE TABLET BY MOUTH DAILY 90 tablet 0   • VAGIFEM 10 MCG tablet vaginal tablet USE VAGINALLY 2-3 TIMES PER WEEK  5     No current facility-administered medications on file prior to visit.        ALLERGIES:     Allergies   Allergen Reactions   • Morphine And Related        Social History     Socioeconomic History   • Marital status:      Spouse name: Aris   • Number of children: Not on file   • Years of education: High School   • Highest education level: Not on file   Occupational History   • Occupation:    Tobacco Use   • Smoking status: Former Smoker     Packs/day: 1.00     Years: 17.00     Pack years: 17.00     Types: Cigarettes   Substance and Sexual Activity   • Alcohol use: Yes     Comment: Occasional   • Drug use: No         Cancer-related family history includes Cancer in her maternal grandmother and other.     Review of Systems   Constitutional: Negative for fatigue.   Respiratory: Negative for chest tightness, shortness of breath and wheezing.    Gastrointestinal: Negative for abdominal pain, constipation, diarrhea, nausea and vomiting.   Neurological: Negative for weakness.     Objective      Vitals:    05/09/19 1403   BP: 123/77   Pulse: 64   Resp: 18   Temp: 98.3 °F (36.8 °C)   TempSrc: Oral   SpO2: 99%   Weight: 81.2 kg (179 lb)   Height: 166 cm (65.35\")  Comment: new yearly " height   PainSc: 0-No pain     Current Status 5/9/2019   ECOG score 0       Physical Exam    GENERAL: Well-developed, well-nourished female in no acute distress.   SKIN: Warm, dry without rashes, purpura or petechiae.   HEAD: Normocephalic.   EYES: Pupils equal, round and reactive to light. EOMs intact. Conjunctivae normal.   EARS: Hearing intact.   NOSE: Septum midline. No excoriations or nasal discharge.   MOUTH: Tongue is well-papillated; no stomatitis or ulcers. Lips normal.   THROAT: Oropharynx without lesions or exudates.   NECK: Supple with good range of motion; no thyromegaly or masses, no JVD or bruits.   LYMPHATICS: No cervical, supraclavicular, axillary or inguinal adenopathy.   CHEST: Lungs clear to percussion and auscultation.   CARDIAC: Regular rate and rhythm without murmurs, rubs or gallops.   ABDOMEN: Soft, nontender with no organomegaly or masses.   EXTREMITIES: No clubbing, cyanosis or edema.   NEUROLOGICAL: No focal neurological deficits.     RECENT LABS:  Hematology WBC   Date Value Ref Range Status   05/02/2019 5.53 3.40 - 10.80 10*3/mm3 Final     RBC   Date Value Ref Range Status   05/02/2019 4.62 3.77 - 5.28 10*6/mm3 Final     Hemoglobin   Date Value Ref Range Status   05/02/2019 13.8 12.0 - 15.9 g/dL Final     Hematocrit   Date Value Ref Range Status   05/02/2019 42.9 34.0 - 46.6 % Final     Platelets   Date Value Ref Range Status   05/02/2019 240 140 - 450 10*3/mm3 Final        Assessment/Plan   1. This is a 48-year-old female with a history of recurrent non-Hodgkin lymphoma, status post therapy with Treanda/Rituxan x5 cycles and radiation therapy followed by observation. At approximately 5 years out from radiation therapy, she remains without evidence recurrent disease.  As she is seen May 9, 2019 8-year 7 she is doing well we will adjust her Synthroid mildly to address her elevated TSH.  We will plan to assess her in 12 months, at year 8 again with laboratory studies including CBC, CMP,  sedimentation rate, lipid profile and TSH.

## 2019-05-15 ENCOUNTER — APPOINTMENT (OUTPATIENT)
Dept: LAB | Facility: HOSPITAL | Age: 49
End: 2019-05-15

## 2019-05-15 ENCOUNTER — APPOINTMENT (OUTPATIENT)
Dept: ONCOLOGY | Facility: CLINIC | Age: 49
End: 2019-05-15

## 2020-05-21 ENCOUNTER — TELEPHONE (OUTPATIENT)
Dept: ONCOLOGY | Facility: CLINIC | Age: 50
End: 2020-05-21

## 2020-05-27 ENCOUNTER — TELEPHONE (OUTPATIENT)
Dept: ONCOLOGY | Facility: CLINIC | Age: 50
End: 2020-05-27

## 2020-05-27 RX ORDER — LEVOTHYROXINE SODIUM 0.15 MG/1
TABLET ORAL
Qty: 30 TABLET | Refills: 0 | Status: SHIPPED | OUTPATIENT
Start: 2020-05-27 | End: 2020-07-22

## 2020-05-27 NOTE — TELEPHONE ENCOUNTER
----- Message from Kym Casanova sent at 5/27/2020  1:21 PM EDT -----  Please call pt to schedule appt. There is a note in the profile from the HUB with a current phone #. Thanks Kym.

## 2020-06-17 ENCOUNTER — LAB (OUTPATIENT)
Dept: LAB | Facility: HOSPITAL | Age: 50
End: 2020-06-17

## 2020-06-17 DIAGNOSIS — C85.90 NON HODGKIN'S LYMPHOMA, STAGE IA (HCC): Primary | ICD-10-CM

## 2020-06-17 LAB
ALBUMIN SERPL-MCNC: 4.7 G/DL (ref 3.5–5.2)
ALBUMIN/GLOB SERPL: 1.5 G/DL (ref 1.1–2.4)
ALP SERPL-CCNC: 65 U/L (ref 38–116)
ALT SERPL W P-5'-P-CCNC: 23 U/L (ref 0–33)
ANION GAP SERPL CALCULATED.3IONS-SCNC: 11.2 MMOL/L (ref 5–15)
AST SERPL-CCNC: 30 U/L (ref 0–32)
BASOPHILS # BLD AUTO: 0.04 10*3/MM3 (ref 0–0.2)
BASOPHILS NFR BLD AUTO: 0.8 % (ref 0–1.5)
BILIRUB SERPL-MCNC: 0.4 MG/DL (ref 0.2–1.2)
BUN BLD-MCNC: 19 MG/DL (ref 6–20)
BUN/CREAT SERPL: 23.2 (ref 7.3–30)
CALCIUM SPEC-SCNC: 10.5 MG/DL (ref 8.5–10.2)
CHLORIDE SERPL-SCNC: 100 MMOL/L (ref 98–107)
CHOLEST SERPL-MCNC: 191 MG/DL (ref 0–200)
CO2 SERPL-SCNC: 27.8 MMOL/L (ref 22–29)
CREAT BLD-MCNC: 0.82 MG/DL (ref 0.6–1.1)
DEPRECATED RDW RBC AUTO: 45 FL (ref 37–54)
EOSINOPHIL # BLD AUTO: 0.14 10*3/MM3 (ref 0–0.4)
EOSINOPHIL NFR BLD AUTO: 2.8 % (ref 0.3–6.2)
ERYTHROCYTE [DISTWIDTH] IN BLOOD BY AUTOMATED COUNT: 13.2 % (ref 12.3–15.4)
ERYTHROCYTE [SEDIMENTATION RATE] IN BLOOD: 10 MM/HR (ref 0–20)
GFR SERPL CREATININE-BSD FRML MDRD: 74 ML/MIN/1.73
GLOBULIN UR ELPH-MCNC: 3.1 GM/DL (ref 1.8–3.5)
GLUCOSE BLD-MCNC: 83 MG/DL (ref 74–124)
HCT VFR BLD AUTO: 40.9 % (ref 34–46.6)
HDLC SERPL-MCNC: 105 MG/DL (ref 40–60)
HGB BLD-MCNC: 13 G/DL (ref 12–15.9)
IMM GRANULOCYTES # BLD AUTO: 0.01 10*3/MM3 (ref 0–0.05)
IMM GRANULOCYTES NFR BLD AUTO: 0.2 % (ref 0–0.5)
LDLC SERPL CALC-MCNC: 76 MG/DL (ref 0–100)
LDLC/HDLC SERPL: 0.72 {RATIO}
LYMPHOCYTES # BLD AUTO: 1.39 10*3/MM3 (ref 0.7–3.1)
LYMPHOCYTES NFR BLD AUTO: 27.9 % (ref 19.6–45.3)
MCH RBC QN AUTO: 29.4 PG (ref 26.6–33)
MCHC RBC AUTO-ENTMCNC: 31.8 G/DL (ref 31.5–35.7)
MCV RBC AUTO: 92.5 FL (ref 79–97)
MONOCYTES # BLD AUTO: 0.45 10*3/MM3 (ref 0.1–0.9)
MONOCYTES NFR BLD AUTO: 9 % (ref 5–12)
NEUTROPHILS # BLD AUTO: 2.95 10*3/MM3 (ref 1.7–7)
NEUTROPHILS NFR BLD AUTO: 59.3 % (ref 42.7–76)
NRBC BLD AUTO-RTO: 0 /100 WBC (ref 0–0.2)
PLATELET # BLD AUTO: 247 10*3/MM3 (ref 140–450)
PMV BLD AUTO: 9.5 FL (ref 6–12)
POTASSIUM BLD-SCNC: 4.8 MMOL/L (ref 3.5–4.7)
PROT SERPL-MCNC: 7.8 G/DL (ref 6.3–8)
RBC # BLD AUTO: 4.42 10*6/MM3 (ref 3.77–5.28)
SODIUM BLD-SCNC: 139 MMOL/L (ref 134–145)
TRIGL SERPL-MCNC: 52 MG/DL (ref 0–150)
TSH SERPL DL<=0.05 MIU/L-ACNC: 1.17 UIU/ML (ref 0.27–4.2)
VLDLC SERPL-MCNC: 10.4 MG/DL (ref 5–40)
WBC NRBC COR # BLD: 4.98 10*3/MM3 (ref 3.4–10.8)

## 2020-06-17 PROCEDURE — 36415 COLL VENOUS BLD VENIPUNCTURE: CPT

## 2020-06-17 PROCEDURE — 85652 RBC SED RATE AUTOMATED: CPT | Performed by: INTERNAL MEDICINE

## 2020-06-17 PROCEDURE — 80053 COMPREHEN METABOLIC PANEL: CPT

## 2020-06-17 PROCEDURE — 85025 COMPLETE CBC W/AUTO DIFF WBC: CPT

## 2020-06-17 PROCEDURE — 80061 LIPID PANEL: CPT | Performed by: INTERNAL MEDICINE

## 2020-06-17 PROCEDURE — 84443 ASSAY THYROID STIM HORMONE: CPT | Performed by: INTERNAL MEDICINE

## 2020-06-17 NOTE — PROGRESS NOTES
Subjective .  Again no additional symptoms, normal performance status, contacted by telephone after video visit was unsuccessful.  She agrees to telephone contact.    REASONS FOR FOLLOWUP:   1. 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   2. Recurrent disease, involving the left thyroid, biopsy-proven, PET scan reported hypermetabolism with left thyroid gland as well as a right neck lymph node.   3. Start chemotherapy with Treanda plus Rituxan on 03/21/2011. Had reaction to Rituxan infusion with significant skin rashes.   4. The [facility], doing well, with additional Treanda/Rituxan chemotherapy already planned.   5. The patient presents for a second cycle of Treanda/Rituxan with Rituxan moved to third day, followup PET planned.   6. Patient seen back post [place] review, 4-6 cycles Treanda/Rituxan planned, radiotherapy reconsultation planned, cycle #4 Treanda/Rituxan to be given with Treanda/Rituxan second day provided.   7. Followup review via [facility] er. Total of 6 cycles of planned Rituxan followed by consultation for radiotherapy and/or surgery. Followup thereafter without transplantation.   8. Patient seen 12/20/2011 with normal scans, followup reassessments planned. Note - a total of 5 courses of c hemotherapy were given.   9. Patient with followup studies performed on 04/17/2012; no evidence of recurrent disease.   10. CT scans 09/11/2012 with slight interval increase in sub-pathologic lymph nodes of the neck; 3-month interval scans planned.   11. Followup scans on 12/04/2012 showed no evidence of progressive disease, followup assessment in 6 months planned.   12. Followup scans in June 2013 without evidence of recurrent disease, increasing hot flashes noted, vitamin E trial advocated versus trial of Effexor, followup scans planned at  1 year with a 6-month review as well.   13. The patient seen 11/20/2013, stable with evidence of perimenopause. Followup scans scheduled at 6 months, gynecologic assessment pursued.   14. Patient seen per GYN - postmenopausal status, followup sca ns without evidence of recurrent disease at 3 years, every-6-month followups through 5 years planned.   15. Patient status post ENT assessment with Dr. Jasen Dela Cruz with new neck node 10/13/2014, needle biopsy inconclusive, node removal consistent with reactive node only, 10/27/2014; assessments planned through our office thereafter.   16. Patient seen in office 07/08/2015, stable without any evidence of recurrent disease, every-6-month followup planned through year 5.   17. Patient seen in office 02/17/2016, stable; followup laboratory studies anticipated additional 6 months through the 5-year daisy; thyroid medication refilled; pneumococcal vaccine PPSV23 provided.   18. Patient seen August 03, 2016, stable clinically, laboratory studies-normal, Prevnar 13 provided  19. Patient reviewed April 28, 2017, stable without any evidence recurrence, yearly follow-up planned, thyroid medication refilled  20. Patient reviewed April 30, 2018, excellent performance status  21. Patient reviewed May 9, 2019 stable, adjustment of Synthroid  22. Patient reviewed June 24, 2020 by telephone, doing well    History of Present Illness       The patient is a 49 year-old female with a history of recurrent non-Hodgkin lymphoma, status post therapy with Treanda/Rituxan x5 cycles radiation therapy, followed by observation. As she is seen back at 3 years, followup scans have now been done, which fortunately show no evidence o f recurrent disease. Recent CT of the neck, chest, abdomen and pelvis show a few sub centimeter neck nodes that are non-pathologically enlarged. As the patient is seen back, she had been last reviewed 11/20/2013, again, generally doing well. She had bee n developing  perimenopausal symptoms and was referred back to her gynecologist, Dr. Russo.   As the patient was seen 05/07/2014, she was postmenopausal, handling this well, following with her gynecologist. Plans were made to follow again at a 6-month interv al. We were notified by her ENT physician that she had developed a mid-cervical node 10/13/2014. This had been several cm in size and needle biopsy was done and found to be inconclusive. He contacted our practice and we requested removal of the node. Final path was consistent with reactive findings only.   As the patient returns today, 12/12/2014, she has not had any systemic symptoms and feels well overall, though one remains concerned about recurrent lymphoma.   As the patient was asked to be re-reviewed again, she presents now 07/08/2015. She is doing well without any additional issues since last reviewed.   The patient is now seen back 02/17/2016, feeling well overall. She has had review by ENT when there was thought to be a nodularity in her thyr oid, but this evidently did not turn out to be a serious problem. As she is seen today, she again has excellent performance status and no other new findings. We have discussed that she should obtain a flu shot and reviewed the pneumococcal vaccine as w tanika, hoping to provide PPSV23 here in our office today, and as she returns, Prevnar 13. We are hoping to provide PCV13.   The patient is next seen August 03, 2016.  She is just returned from a [place] trip with her family states that she is felt well with normal stamina, no fevers chills night sweats or other systemic symptoms.  Additionally she's had no development any thrombotic complications.    The patient is next seen April 28, 2017.  She continues to do well overall with excellent performance status.  She's had no additional medical issues since last seen and indicates that she does not have a primary care physician.  We plan to continue to see her yearly.    The patient is  next seen April 30, 2018.  She has done very well over the last year with no additional medical issues.  The patient is next seen May 9, 2019 continue to do well overall.  She has not yet been able to find primary care.  We continue to assess her yearly.     She is next reviewed June 24, 2020 by telephone.  She is not having any additional issues feeling quite well.  Her calcium is elevated slightly secondary to oral supplementation which going to adjust and managed by dietary means.  Her performance status, stamina, work ability have all remained stable to excellent.  Past Medical History:   Diagnosis Date   • Antiphospholipid antibody syndrome (CMS/HCC) 2000   • Endometriosis 2000   • Lymphoma of thyroid gland (CMS/[facility])     Stage IE large B cell non-Hodgkin's lymphoma       ONCOLOGIC HISTORY:  (History from previous dates can be found in the separate document.)    Current Outpatient Medications on File Prior to Visit   Medication Sig Dispense Refill   • Cholecalciferol (D3 ADULT PO) Take 2,000 Units by mouth.     • levothyroxine (SYNTHROID, LEVOTHROID) 150 MCG tablet TAKE ONE TABLET BY MOUTH DAILY 30 tablet 0   • VAGIFEM 10 MCG tablet vaginal tablet USE VAGINALLY 2-3 TIMES PER WEEK  5     No current facility-administered medications on file prior to visit.        ALLERGIES:     Allergies   Allergen Reactions   • Morphine And Related        Social History     Socioeconomic History   • Marital status:      Spouse name: Aris   • Number of children: Not on file   • Years of education: High School   • Highest education level: Not on file   Occupational History   • Occupation:    Tobacco Use   • Smoking status: Former Smoker     Packs/day: 1.00     Years: 17.00     Pack years: 17.00     Types: Cigarettes   Substance and Sexual Activity   • Alcohol use: Yes     Comment: Occasional   • Drug use: No         Cancer-related family history includes Cancer in her maternal grandmother and another family  member.     Review of Systems   Constitutional: Negative for fatigue.   Respiratory: Negative for chest tightness, shortness of breath and wheezing.    Gastrointestinal: Negative for abdominal pain, constipation, diarrhea, nausea and vomiting.   Neurological: Negative for weakness.     Objective      There were no vitals filed for this visit.  Current Status 5/9/2019   ECOG score 0     Previous physical exam from May 2019  Physical Exam    GENERAL: Well-developed, well-nourished female in no acute distress.   SKIN: Warm, dry without rashes, purpura or petechiae.   HEAD: Normocephalic.   EYES: Pupils equal, round and reactive to light. EOMs intact. Conjunctivae normal.   EARS: Hearing intact.   NOSE: Septum midline. No excoriations or nasal discharge.   MOUTH: Tongue is well-papillated; no stomatitis or ulcers. Lips normal.   THROAT: Oropharynx without lesions or exudates.   NECK: Supple with good range of motion; no thyromegaly or masses, no JVD or bruits.   LYMPHATICS: No cervical, supraclavicular, axillary or inguinal adenopathy.   CHEST: Lungs clear to percussion and auscultation.   CARDIAC: Regular rate and rhythm without murmurs, rubs or gallops.   ABDOMEN: Soft, nontender with no organomegaly or masses.   EXTREMITIES: No clubbing, cyanosis or edema.   NEUROLOGICAL: No focal neurological deficits.     RECENT LABS:  Hematology WBC   Date Value Ref Range Status   06/17/2020 4.98 3.40 - 10.80 10*3/mm3 Final     RBC   Date Value Ref Range Status   06/17/2020 4.42 3.77 - 5.28 10*6/mm3 Final     Hemoglobin   Date Value Ref Range Status   06/17/2020 13.0 12.0 - 15.9 g/dL Final     Hematocrit   Date Value Ref Range Status   06/17/2020 40.9 34.0 - 46.6 % Final     Platelets   Date Value Ref Range Status   06/17/2020 247 140 - 450 10*3/mm3 Final        Assessment/Plan   1. This is a 49 year-old female with a history of recurrent non-Hodgkin lymphoma, status post therapy with Treanda/Rituxan x5 cycles and radiation  therapy followed by observation. At approximately 5 years out from radiation therapy, she remains without evidence recurrent disease.  As she is seen May 9, 2019 8-year 7 she is doing well we will adjust her Synthroid mildly to address her elevated TSH.  She is reviewed yearly and is assessed next June 24, 2020.  Her studies are excellent except for mild hypercalcemia which we will adjust by reducing her oral supplement.                  We will plan to assess her in 12 months, at year 8 again with laboratory studies including CBC, CMP, sedimentation rate, lipid profile and TSH.            Unable to complete visit using a video connection to the patient. A phone visit was used to complete this visits. Total time of discussion was 12 minutes.

## 2020-06-24 ENCOUNTER — TELEMEDICINE (OUTPATIENT)
Dept: ONCOLOGY | Facility: CLINIC | Age: 50
End: 2020-06-24

## 2020-06-24 DIAGNOSIS — C85.90 NON HODGKIN'S LYMPHOMA, STAGE IA (HCC): Primary | ICD-10-CM

## 2020-06-24 PROCEDURE — 99213 OFFICE O/P EST LOW 20 MIN: CPT | Performed by: INTERNAL MEDICINE

## 2020-07-22 RX ORDER — LEVOTHYROXINE SODIUM 0.15 MG/1
TABLET ORAL
Qty: 90 TABLET | Refills: 2 | Status: SHIPPED | OUTPATIENT
Start: 2020-07-22 | End: 2021-04-27

## 2020-11-05 ENCOUNTER — TELEPHONE (OUTPATIENT)
Dept: ONCOLOGY | Facility: CLINIC | Age: 50
End: 2020-11-05

## 2020-11-05 NOTE — TELEPHONE ENCOUNTER
Patient called she ask if she could get a note for her work [employer], because of her medical condition her work is requiring her to wear a mask when she in her office and alone, she wanted to know if the doctor would give her a note stating this was not necessary? Please call to discuss  Best call back number [phone]

## 2020-11-05 NOTE — TELEPHONE ENCOUNTER
PT CALLING STATING THAT HER WORK IS REQUIRING HER TO WEAR A MASK AT WORK WHEN SHE IS IN HER OFFICE ALONE. SHE WANTS A NOTE STATING THAT IS NOT NECESSARY. SAID IT HAS SOMETHING TO DO W/ HER MEDICAL CONDITION. TRIED TO CALL HER TO GET MORE DETAILS. L/M. WILL GO AHEAD AND SEND THIS OFF TO MA POOL TO DISCUSS W/ MD.

## 2020-11-09 NOTE — TELEPHONE ENCOUNTER
Per Dr. Borja, it is ok for the patient to take frequent breaks. Letter has been done and faxed to the patient.

## 2021-04-06 ENCOUNTER — BULK ORDERING (OUTPATIENT)
Dept: CASE MANAGEMENT | Facility: OTHER | Age: 51
End: 2021-04-06

## 2021-04-06 DIAGNOSIS — Z23 IMMUNIZATION DUE: ICD-10-CM

## 2021-04-27 RX ORDER — LEVOTHYROXINE SODIUM 0.15 MG/1
TABLET ORAL
Qty: 90 TABLET | Refills: 1 | Status: SHIPPED | OUTPATIENT
Start: 2021-04-27 | End: 2021-08-23

## 2021-06-10 ENCOUNTER — APPOINTMENT (OUTPATIENT)
Dept: LAB | Facility: HOSPITAL | Age: 51
End: 2021-06-10

## 2021-06-16 ENCOUNTER — APPOINTMENT (OUTPATIENT)
Dept: LAB | Facility: HOSPITAL | Age: 51
End: 2021-06-16

## 2021-07-30 ENCOUNTER — TELEPHONE (OUTPATIENT)
Dept: ONCOLOGY | Facility: CLINIC | Age: 51
End: 2021-07-30

## 2021-07-30 NOTE — TELEPHONE ENCOUNTER
Caller: Katt Germain    Relationship to patient: Self    Best call back number: [phone]    Chief complaint: PT NEEDS TO SCHEDULE YEARLY    Type of visit: FOLLOW UP    Requested date:     If rescheduling, when is the original appointment: 06/24    Additional notes:

## 2021-08-16 ENCOUNTER — LAB (OUTPATIENT)
Dept: LAB | Facility: HOSPITAL | Age: 51
End: 2021-08-16

## 2021-08-16 DIAGNOSIS — C85.90 NON HODGKIN'S LYMPHOMA, STAGE IA (HCC): Primary | ICD-10-CM

## 2021-08-16 LAB
ALBUMIN SERPL-MCNC: 4.5 G/DL (ref 3.5–5.2)
ALBUMIN/GLOB SERPL: 1.7 G/DL (ref 1.1–2.4)
ALP SERPL-CCNC: 81 U/L (ref 38–116)
ALT SERPL W P-5'-P-CCNC: 18 U/L (ref 0–33)
ANION GAP SERPL CALCULATED.3IONS-SCNC: 8.7 MMOL/L (ref 5–15)
AST SERPL-CCNC: 27 U/L (ref 0–32)
BASOPHILS # BLD AUTO: 0.06 10*3/MM3 (ref 0–0.2)
BASOPHILS NFR BLD AUTO: 1.1 % (ref 0–1.5)
BILIRUB SERPL-MCNC: 0.3 MG/DL (ref 0.2–1.2)
BUN SERPL-MCNC: 17 MG/DL (ref 6–20)
BUN/CREAT SERPL: 20.2 (ref 7.3–30)
CALCIUM SPEC-SCNC: 9.9 MG/DL (ref 8.5–10.2)
CHLORIDE SERPL-SCNC: 103 MMOL/L (ref 98–107)
CHOLEST SERPL-MCNC: 204 MG/DL (ref 0–200)
CO2 SERPL-SCNC: 28.3 MMOL/L (ref 22–29)
CREAT SERPL-MCNC: 0.84 MG/DL (ref 0.6–1.1)
DEPRECATED RDW RBC AUTO: 44.4 FL (ref 37–54)
EOSINOPHIL # BLD AUTO: 0.15 10*3/MM3 (ref 0–0.4)
EOSINOPHIL NFR BLD AUTO: 2.7 % (ref 0.3–6.2)
ERYTHROCYTE [DISTWIDTH] IN BLOOD BY AUTOMATED COUNT: 13.2 % (ref 12.3–15.4)
ERYTHROCYTE [SEDIMENTATION RATE] IN BLOOD: 10 MM/HR (ref 0–20)
GFR SERPL CREATININE-BSD FRML MDRD: 72 ML/MIN/1.73
GLOBULIN UR ELPH-MCNC: 2.7 GM/DL (ref 1.8–3.5)
GLUCOSE SERPL-MCNC: 87 MG/DL (ref 74–124)
HCT VFR BLD AUTO: 37.8 % (ref 34–46.6)
HDLC SERPL-MCNC: 129 MG/DL (ref 40–60)
HGB BLD-MCNC: 12.3 G/DL (ref 12–15.9)
IMM GRANULOCYTES # BLD AUTO: 0.01 10*3/MM3 (ref 0–0.05)
IMM GRANULOCYTES NFR BLD AUTO: 0.2 % (ref 0–0.5)
LDLC SERPL CALC-MCNC: 67 MG/DL (ref 0–100)
LDLC/HDLC SERPL: 0.52 {RATIO}
LYMPHOCYTES # BLD AUTO: 1.5 10*3/MM3 (ref 0.7–3.1)
LYMPHOCYTES NFR BLD AUTO: 27.1 % (ref 19.6–45.3)
MCH RBC QN AUTO: 29.6 PG (ref 26.6–33)
MCHC RBC AUTO-ENTMCNC: 32.5 G/DL (ref 31.5–35.7)
MCV RBC AUTO: 91.1 FL (ref 79–97)
MONOCYTES # BLD AUTO: 0.47 10*3/MM3 (ref 0.1–0.9)
MONOCYTES NFR BLD AUTO: 8.5 % (ref 5–12)
NEUTROPHILS NFR BLD AUTO: 3.35 10*3/MM3 (ref 1.7–7)
NEUTROPHILS NFR BLD AUTO: 60.4 % (ref 42.7–76)
NRBC BLD AUTO-RTO: 0 /100 WBC (ref 0–0.2)
PLATELET # BLD AUTO: 230 10*3/MM3 (ref 140–450)
PMV BLD AUTO: 9.3 FL (ref 6–12)
POTASSIUM SERPL-SCNC: 4.2 MMOL/L (ref 3.5–4.7)
PROT SERPL-MCNC: 7.2 G/DL (ref 6.3–8)
RBC # BLD AUTO: 4.15 10*6/MM3 (ref 3.77–5.28)
SODIUM SERPL-SCNC: 140 MMOL/L (ref 134–145)
TRIGL SERPL-MCNC: 40 MG/DL (ref 0–150)
TSH SERPL DL<=0.05 MIU/L-ACNC: 9.96 UIU/ML (ref 0.27–4.2)
VLDLC SERPL-MCNC: 8 MG/DL (ref 5–40)
WBC # BLD AUTO: 5.54 10*3/MM3 (ref 3.4–10.8)

## 2021-08-16 PROCEDURE — 85652 RBC SED RATE AUTOMATED: CPT | Performed by: INTERNAL MEDICINE

## 2021-08-16 PROCEDURE — 36415 COLL VENOUS BLD VENIPUNCTURE: CPT

## 2021-08-16 PROCEDURE — 80053 COMPREHEN METABOLIC PANEL: CPT

## 2021-08-16 PROCEDURE — 85025 COMPLETE CBC W/AUTO DIFF WBC: CPT

## 2021-08-16 PROCEDURE — 84443 ASSAY THYROID STIM HORMONE: CPT | Performed by: INTERNAL MEDICINE

## 2021-08-16 PROCEDURE — 80061 LIPID PANEL: CPT | Performed by: INTERNAL MEDICINE

## 2021-08-23 ENCOUNTER — OFFICE VISIT (OUTPATIENT)
Dept: ONCOLOGY | Facility: CLINIC | Age: 51
End: 2021-08-23

## 2021-08-23 ENCOUNTER — APPOINTMENT (OUTPATIENT)
Dept: LAB | Facility: HOSPITAL | Age: 51
End: 2021-08-23

## 2021-08-23 VITALS
BODY MASS INDEX: 30.34 KG/M2 | TEMPERATURE: 98 F | RESPIRATION RATE: 18 BRPM | HEART RATE: 74 BPM | OXYGEN SATURATION: 98 % | SYSTOLIC BLOOD PRESSURE: 132 MMHG | DIASTOLIC BLOOD PRESSURE: 76 MMHG | WEIGHT: 184.3 LBS

## 2021-08-23 DIAGNOSIS — Z23 NEED FOR VACCINATION: ICD-10-CM

## 2021-08-23 DIAGNOSIS — C85.90 NON HODGKIN'S LYMPHOMA, STAGE IA (HCC): Primary | ICD-10-CM

## 2021-08-23 PROCEDURE — 36415 COLL VENOUS BLD VENIPUNCTURE: CPT | Performed by: INTERNAL MEDICINE

## 2021-08-23 PROCEDURE — 99214 OFFICE O/P EST MOD 30 MIN: CPT | Performed by: INTERNAL MEDICINE

## 2021-08-23 RX ORDER — LEVOTHYROXINE SODIUM 175 UG/1
175 TABLET ORAL DAILY
Qty: 90 TABLET | Refills: 3 | Status: SHIPPED | OUTPATIENT
Start: 2021-08-23 | End: 2022-08-19 | Stop reason: SDUPTHER

## 2021-08-23 NOTE — PROGRESS NOTES
Subjective .  Patient feeling well, discussed COVID-19 impact, assessments of antibodies, adjustment of Synthroid    REASONS FOR FOLLOWUP:   1. 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   2. Recurrent disease, involving the left thyroid, biopsy-proven, PET scan reported hypermetabolism with left thyroid gland as well as a right neck lymph node.   3. Start chemotherapy with Treanda plus Rituxan on 03/21/2011. Had reaction to Rituxan infusion with significant skin rashes.   4. The [facility], doing well, with additional Treanda/Rituxan chemotherapy already planned.   5. The patient presents for a second cycle of Treanda/Rituxan with Rituxan moved to third day, followup PET planned.   6. Patient seen back post [place] review, 4-6 cycles Treanda/Rituxan planned, radiotherapy reconsultation planned, cycle #4 Treanda/Rituxan to be given with Treanda/Rituxan second day provided.   7. Followup review via [facility] er. Total of 6 cycles of planned Rituxan followed by consultation for radiotherapy and/or surgery. Followup thereafter without transplantation.   8. Patient seen 12/20/2011 with normal scans, followup reassessments planned. Note - a total of 5 courses of c hemotherapy were given.   9. Patient with followup studies performed on 04/17/2012; no evidence of recurrent disease.   10. CT scans 09/11/2012 with slight interval increase in sub-pathologic lymph nodes of the neck; 3-month interval scans planned.   11. Followup scans on 12/04/2012 showed no evidence of progressive disease, followup assessment in 6 months planned.   12. Followup scans in June 2013 without evidence of recurrent disease, increasing hot flashes noted, vitamin E trial advocated versus trial of Effexor, followup scans planned at 1 year with a 6-month review as well.   13. The  patient seen 11/20/2013, stable with evidence of perimenopause. Followup scans scheduled at 6 months, gynecologic assessment pursued.   14. Patient seen per GYN - postmenopausal status, followup sca ns without evidence of recurrent disease at 3 years, every-6-month followups through 5 years planned.   15. Patient status post ENT assessment with Dr. Jasen Dela Cruz with new neck node 10/13/2014, needle biopsy inconclusive, node removal consistent with reactive node only, 10/27/2014; assessments planned through our office thereafter.   16. Patient seen in office 07/08/2015, stable without any evidence of recurrent disease, every-6-month followup planned through year 5.   17. Patient seen in office 02/17/2016, stable; followup laboratory studies anticipated additional 6 months through the 5-year daisy; thyroid medication refilled; pneumococcal vaccine PPSV23 provided.   18. Patient seen August 03, 2016, stable clinically, laboratory studies-normal, Prevnar 13 provided  19. Patient reviewed April 28, 2017, stable without any evidence recurrence, yearly follow-up planned, thyroid medication refilled  20. Patient reviewed April 30, 2018, excellent performance status  21. Patient reviewed May 9, 2019 stable, adjustment of Synthroid  22. Patient reviewed 6/24/2020 doing well  23. Reassessment 8/23/2021 continue to do well, Synthroid adjustment noted, COVID-19 antibodies obtained      History of Present Illness       The patient is a 50 year-old female with a history of recurrent non-Hodgkin lymphoma, status post therapy with Treanda/Rituxan x5 cycles radiation therapy, followed by observation. As she is seen back at 3 years, followup scans have now been done, which fortunately show no evidence o f recurrent disease. Recent CT of the neck, chest, abdomen and pelvis show a few sub centimeter neck nodes that are non-pathologically enlarged. As the patient is seen back, she had been last reviewed 11/20/2013, again, generally doing  well. She had bee n developing perimenopausal symptoms and was referred back to her gynecologist, Dr. Russo.   As the patient was seen 05/07/2014, she was postmenopausal, handling this well, following with her gynecologist. Plans were made to follow again at a 6-month interv al. We were notified by her ENT physician that she had developed a mid-cervical node 10/13/2014. This had been several cm in size and needle biopsy was done and found to be inconclusive. He contacted our practice and we requested removal of the node. Final path was consistent with reactive findings only.   As the patient returns today, 12/12/2014, she has not had any systemic symptoms and feels well overall, though one remains concerned about recurrent lymphoma.   As the patient was asked to be re-reviewed again, she presents now 07/08/2015. She is doing well without any additional issues since last reviewed.   The patient is now seen back 02/17/2016, feeling well overall. She has had review by ENT when there was thought to be a nodularity in her thyr oid, but this evidently did not turn out to be a serious problem. As she is seen today, she again has excellent performance status and no other new findings. We have discussed that she should obtain a flu shot and reviewed the pneumococcal vaccine as w tanika, hoping to provide PPSV23 here in our office today, and as she returns, Prevnar 13. We are hoping to provide PCV13.   The patient is next seen August 03, 2016.  She is just returned from a [place] trip with her family states that she is felt well with normal stamina, no fevers chills night sweats or other systemic symptoms.  Additionally she's had no development any thrombotic complications.    The patient is next seen April 28, 2017.  She continues to do well overall with excellent performance status.  She's had no additional medical issues since last seen and indicates that she does not have a primary care physician.  We plan to continue to see  her yearly.    The patient is next seen April 30, 2018.  She has done very well over the last year with no additional medical issues.  The patient is next seen May 9, 2019 continue to do well overall.  She has not yet been able to find primary care.  We continue to assess her yearly.     She is next reviewed June 24, 2020 by telephone.  She is not having any additional issues feeling quite well.  Her calcium is elevated slightly secondary to oral supplementation which going to adjust and managed by dietary means.  Her performance status, stamina, work ability have all remained stable to excellent.  This is again the case with the patient is seen 8/23/2021 continue to feel well.  We have discussed that she is not likely immunosuppressed at this point but that assessing her COVID-19 antibodies would be reasonable.  She also needs adjustment of her Synthroid dosing.      Past Medical History:   Diagnosis Date   • Antiphospholipid antibody syndrome (CMS/HCC) 2000   • Endometriosis 2000   • Lymphoma of thyroid gland (CMS/[facility])     Stage IE large B cell non-Hodgkin's lymphoma       ONCOLOGIC HISTORY:  (History from previous dates can be found in the separate document.)    Current Outpatient Medications on File Prior to Visit   Medication Sig Dispense Refill   • Cholecalciferol (D3 ADULT PO) Take 2,000 Units by mouth.     • VAGIFEM 10 MCG tablet vaginal tablet USE VAGINALLY 2-3 TIMES PER WEEK  5   • [DISCONTINUED] levothyroxine (SYNTHROID, LEVOTHROID) 150 MCG tablet TAKE ONE TABLET BY MOUTH DAILY 90 tablet 1     No current facility-administered medications on file prior to visit.       ALLERGIES:     Allergies   Allergen Reactions   • Morphine And Related        Social History     Socioeconomic History   • Marital status:      Spouse name: Aris   • Number of children: Not on file   • Years of education: High School   • Highest education level: Not on file   Tobacco Use   • Smoking status: Former Smoker      Packs/day: 1.00     Years: 17.00     Pack years: 17.00     Types: Cigarettes   Substance and Sexual Activity   • Alcohol use: Yes     Comment: Occasional   • Drug use: No         Cancer-related family history includes Cancer in her maternal grandmother and another family member.     Review of Systems   Constitutional: Negative for fatigue.   Respiratory: Negative for chest tightness, shortness of breath and wheezing.    Gastrointestinal: Negative for abdominal pain, constipation, diarrhea, nausea and vomiting.   Neurological: Negative for weakness.     Objective      Vitals:    08/23/21 1409   BP: 132/76   Pulse: 74   Resp: 18   Temp: 98 °F (36.7 °C)   SpO2: 98%   Weight: 83.6 kg (184 lb 4.8 oz)   PainSc: 0-No pain     Current Status 8/23/2021   ECOG score 0       Physical Exam    GENERAL: Well-developed, well-nourished female in no acute distress.   SKIN: Warm, dry without rashes, purpura or petechiae.   HEAD: Normocephalic.   EYES: Pupils equal, round and reactive to light. EOMs intact. Conjunctivae normal.   EARS: Hearing intact.   NOSE: Septum midline. No excoriations or nasal discharge.   MOUTH: Tongue is well-papillated; no stomatitis or ulcers. Lips normal.   THROAT: Oropharynx without lesions or exudates.   NECK: Supple with good range of motion; no thyromegaly or masses, no JVD or bruits.   LYMPHATICS: No cervical, supraclavicular, axillary or inguinal adenopathy.   CHEST: Lungs clear to percussion and auscultation.   CARDIAC: Regular rate and rhythm without murmurs, rubs or gallops.   ABDOMEN: Soft, nontender with no organomegaly or masses.   EXTREMITIES: No clubbing, cyanosis or edema.   NEUROLOGICAL: No focal neurological deficits.     RECENT LABS:  Hematology WBC   Date Value Ref Range Status   08/16/2021 5.54 3.40 - 10.80 10*3/mm3 Final     RBC   Date Value Ref Range Status   08/16/2021 4.15 3.77 - 5.28 10*6/mm3 Final     Hemoglobin   Date Value Ref Range Status   08/16/2021 12.3 12.0 - 15.9 g/dL Final      Hematocrit   Date Value Ref Range Status   08/16/2021 37.8 34.0 - 46.6 % Final     Platelets   Date Value Ref Range Status   08/16/2021 230 140 - 450 10*3/mm3 Final        Assessment/Plan   1. This is a 50 year-old female with a history of recurrent non-Hodgkin lymphoma, status post therapy with Treanda/Rituxan x5 cycles and radiation therapy followed by observation. At approximately 5 years out from radiation therapy, she remains without evidence recurrent disease.  As she is seen May 9, 2019 8-year 7 she is doing well we will adjust her Synthroid mildly to address her elevated TSH.  The patient is continued monthly assessment including June 24, 2020 and now again 8/23/2021.  Her TSH is mildly elevated and will adjust her Synthroid 175 mcg daily.    2.  Patient will also return to laboratory today for COVID-19 antibody testing to document ongoing response to previous vaccinations.    3.  1 year follow-up MD, CBC, TSH, CMP

## 2021-08-24 LAB
SARS-COV-2 AB SERPL IA-ACNC: >2500 U/ML
SARS-COV-2 AB SERPL-IMP: POSITIVE

## 2021-08-25 ENCOUNTER — TELEPHONE (OUTPATIENT)
Dept: ONCOLOGY | Facility: CLINIC | Age: 51
End: 2021-08-25

## 2021-08-25 NOTE — TELEPHONE ENCOUNTER
Per Dr. Borja, call pt to let her know her COVID antibody test shows that she has antibodies. Attempted to call pt, no answer. LVM for her to return call.     Pt returned call. Informed her of testing results. She v/u.

## 2022-08-04 ENCOUNTER — TELEPHONE (OUTPATIENT)
Dept: ONCOLOGY | Facility: CLINIC | Age: 52
End: 2022-08-04

## 2022-08-04 NOTE — TELEPHONE ENCOUNTER
HUB TO READ:  Left msg for pt to call with new ins infor.  Please staff msg me when complete.  Thank you, Miesha with [employer]

## 2022-08-11 ENCOUNTER — TELEPHONE (OUTPATIENT)
Dept: ONCOLOGY | Facility: CLINIC | Age: 52
End: 2022-08-11

## 2022-08-11 NOTE — TELEPHONE ENCOUNTER
HUB TO READ:  Left msg for pt to call with new ins info.  Please staff msg me when complete.  Thank you, Miesha with [employer]

## 2022-08-12 ENCOUNTER — LAB (OUTPATIENT)
Dept: LAB | Facility: HOSPITAL | Age: 52
End: 2022-08-12

## 2022-08-12 DIAGNOSIS — C85.90 NON HODGKIN'S LYMPHOMA, STAGE IA: ICD-10-CM

## 2022-08-12 LAB
ALBUMIN SERPL-MCNC: 4.7 G/DL (ref 3.5–5.2)
ALBUMIN/GLOB SERPL: 1.7 G/DL (ref 1.1–2.4)
ALP SERPL-CCNC: 87 U/L (ref 38–116)
ALT SERPL W P-5'-P-CCNC: 25 U/L (ref 0–33)
ANION GAP SERPL CALCULATED.3IONS-SCNC: 10.6 MMOL/L (ref 5–15)
AST SERPL-CCNC: 29 U/L (ref 0–32)
BASOPHILS # BLD AUTO: 0.04 10*3/MM3 (ref 0–0.2)
BASOPHILS NFR BLD AUTO: 0.7 % (ref 0–1.5)
BILIRUB SERPL-MCNC: 0.3 MG/DL (ref 0.2–1.2)
BUN SERPL-MCNC: 16 MG/DL (ref 6–20)
BUN/CREAT SERPL: 20 (ref 7.3–30)
CALCIUM SPEC-SCNC: 10 MG/DL (ref 8.5–10.2)
CHLORIDE SERPL-SCNC: 102 MMOL/L (ref 98–107)
CO2 SERPL-SCNC: 27.4 MMOL/L (ref 22–29)
CREAT SERPL-MCNC: 0.8 MG/DL (ref 0.6–1.1)
DEPRECATED RDW RBC AUTO: 43.1 FL (ref 37–54)
EGFRCR SERPLBLD CKD-EPI 2021: 89.9 ML/MIN/1.73
EOSINOPHIL # BLD AUTO: 0.14 10*3/MM3 (ref 0–0.4)
EOSINOPHIL NFR BLD AUTO: 2.6 % (ref 0.3–6.2)
ERYTHROCYTE [DISTWIDTH] IN BLOOD BY AUTOMATED COUNT: 13 % (ref 12.3–15.4)
GLOBULIN UR ELPH-MCNC: 2.8 GM/DL (ref 1.8–3.5)
GLUCOSE SERPL-MCNC: 98 MG/DL (ref 74–124)
HCT VFR BLD AUTO: 40.6 % (ref 34–46.6)
HGB BLD-MCNC: 13.2 G/DL (ref 12–15.9)
IMM GRANULOCYTES # BLD AUTO: 0.01 10*3/MM3 (ref 0–0.05)
IMM GRANULOCYTES NFR BLD AUTO: 0.2 % (ref 0–0.5)
LYMPHOCYTES # BLD AUTO: 1.46 10*3/MM3 (ref 0.7–3.1)
LYMPHOCYTES NFR BLD AUTO: 27.3 % (ref 19.6–45.3)
MCH RBC QN AUTO: 29.4 PG (ref 26.6–33)
MCHC RBC AUTO-ENTMCNC: 32.5 G/DL (ref 31.5–35.7)
MCV RBC AUTO: 90.4 FL (ref 79–97)
MONOCYTES # BLD AUTO: 0.45 10*3/MM3 (ref 0.1–0.9)
MONOCYTES NFR BLD AUTO: 8.4 % (ref 5–12)
NEUTROPHILS NFR BLD AUTO: 3.24 10*3/MM3 (ref 1.7–7)
NEUTROPHILS NFR BLD AUTO: 60.8 % (ref 42.7–76)
NRBC BLD AUTO-RTO: 0 /100 WBC (ref 0–0.2)
PLATELET # BLD AUTO: 256 10*3/MM3 (ref 140–450)
PMV BLD AUTO: 9.2 FL (ref 6–12)
POTASSIUM SERPL-SCNC: 4.7 MMOL/L (ref 3.5–4.7)
PROT SERPL-MCNC: 7.5 G/DL (ref 6.3–8)
RBC # BLD AUTO: 4.49 10*6/MM3 (ref 3.77–5.28)
SODIUM SERPL-SCNC: 140 MMOL/L (ref 134–145)
TSH SERPL DL<=0.05 MIU/L-ACNC: 0.07 UIU/ML (ref 0.27–4.2)
WBC NRBC COR # BLD: 5.34 10*3/MM3 (ref 3.4–10.8)

## 2022-08-12 PROCEDURE — 80050 GENERAL HEALTH PANEL: CPT

## 2022-08-12 PROCEDURE — 36415 COLL VENOUS BLD VENIPUNCTURE: CPT

## 2022-08-18 NOTE — PROGRESS NOTES
Subjective .  Patient feeling well, discussed COVID-19 impact, assessments of antibodies, adjustment of Synthroid    REASONS FOR FOLLOWUP:   1. 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   2. Recurrent disease, involving the left thyroid, biopsy-proven, PET scan reported hypermetabolism with left thyroid gland as well as a right neck lymph node.   3. Start chemotherapy with Treanda plus Rituxan on 03/21/2011. Had reaction to Rituxan infusion with significant skin rashes.   4. The patient [facility], doing well, with additional Treanda/Rituxan chemotherapy already planned.   5. The patient presents for a second cycle of Treanda/Rituxan with Rituxan moved to third day, followup PET planned.   6. Patient seen back post [place] review, 4-6 cycles Treanda/Rituxan planned, radiotherapy reconsultation planned, cycle #4 Treanda/Rituxan to be given with Treanda/Rituxan second day provided.   7. Followup review via [facility] er. Total of 6 cycles of planned Rituxan followed by consultation for radiotherapy and/or surgery. Followup thereafter without transplantation.   8. Patient seen 12/20/2011 with normal scans, followup reassessments planned. Note - a total of 5 courses of c hemotherapy were given.   9. Patient with followup studies performed on 04/17/2012; no evidence of recurrent disease.   10. CT scans 09/11/2012 with slight interval increase in sub-pathologic lymph nodes of the neck; 3-month interval scans planned.   11. Followup scans on 12/04/2012 showed no evidence of progressive disease, followup assessment in 6 months planned.   12. Followup scans in June 2013 without evidence of recurrent disease, increasing hot flashes noted, vitamin E trial advocated versus trial of Effexor, followup scans planned at 1 year with a 6-month review as well.   13. The  patient seen 11/20/2013, stable with evidence of perimenopause. Followup scans scheduled at 6 months, gynecologic assessment pursued.   14. Patient seen per GYN - postmenopausal status, followup sca ns without evidence of recurrent disease at 3 years, every-6-month followups through 5 years planned.   15. Patient status post ENT assessment with Dr. Jasen Dela Cruz with new neck node 10/13/2014, needle biopsy inconclusive, node removal consistent with reactive node only, 10/27/2014; assessments planned through our office thereafter.   16. Patient seen in office 07/08/2015, stable without any evidence of recurrent disease, every-6-month followup planned through year 5.   17. Patient seen in office 02/17/2016, stable; followup laboratory studies anticipated additional 6 months through the 5-year daisy; thyroid medication refilled; pneumococcal vaccine PPSV23 provided.   18. Patient seen August 03, 2016, stable clinically, laboratory studies-normal, Prevnar 13 provided  19. Patient reviewed April 28, 2017, stable without any evidence recurrence, yearly follow-up planned, thyroid medication refilled  20. Patient reviewed April 30, 2018, excellent performance status  21. Patient reviewed May 9, 2019 stable, adjustment of Synthroid  22. Patient reviewed 6/24/2020 doing well  23. Reassessment 8/23/2021 continue to do well, Synthroid adjustment noted, COVID-19 antibodies obtained, adequate  24. Reassessment 8/19/2022, studies with normal CBC, CMP, TSH suppressed, thyroid adjusted, primary care follow-up requested      History of Present Illness       The patient is a 51 year-old female with a history of recurrent non-Hodgkin lymphoma, status post therapy with Treanda/Rituxan x5 cycles radiation therapy, followed by observation. As she is seen back at 3 years, followup scans have now been done, which fortunately show no evidence o f recurrent disease. Recent CT of the neck, chest, abdomen and pelvis show a few sub centimeter  neck nodes that are non-pathologically enlarged. As the patient is seen back, she had been last reviewed 11/20/2013, again, generally doing well. She had bee n developing perimenopausal symptoms and was referred back to her gynecologist, Dr. Russo.   As the patient was seen 05/07/2014, she was postmenopausal, handling this well, following with her gynecologist. Plans were made to follow again at a 6-month interv al. We were notified by her ENT physician that she had developed a mid-cervical node 10/13/2014. This had been several cm in size and needle biopsy was done and found to be inconclusive. He contacted our practice and we requested removal of the node. Final path was consistent with reactive findings only.   As the patient returns today, 12/12/2014, she has not had any systemic symptoms and feels well overall, though one remains concerned about recurrent lymphoma.   As the patient was asked to be re-reviewed again, she presents now 07/08/2015. She is doing well without any additional issues since last reviewed.   The patient is now seen back 02/17/2016, feeling well overall. She has had review by ENT when there was thought to be a nodularity in her thyr oid, but this evidently did not turn out to be a serious problem. As she is seen today, she again has excellent performance status and no other new findings. We have discussed that she should obtain a flu shot and reviewed the pneumococcal vaccine as w tanika, hoping to provide PPSV23 here in our office today, and as she returns, Prevnar 13. We are hoping to provide PCV13.   The patient is next seen August 03, 2016.  She is just returned from a [place] trip with her family states that she is felt well with normal stamina, no fevers chills night sweats or other systemic symptoms.  Additionally she's had no development any thrombotic complications.    The patient is next seen April 28, 2017.  She continues to do well overall with excellent performance status.  She's  had no additional medical issues since last seen and indicates that she does not have a primary care physician.  We plan to continue to see her yearly.    The patient is next seen April 30, 2018.  She has done very well over the last year with no additional medical issues.  The patient is next seen May 9, 2019 continue to do well overall.  She has not yet been able to find primary care.  We continue to assess her yearly.     She is next reviewed June 24, 2020 by telephone.  She is not having any additional issues feeling quite well.  Her calcium is elevated slightly secondary to oral supplementation which going to adjust and managed by dietary means.  Her performance status, stamina, work ability have all remained stable to excellent.  This is again the case with the patient is seen 8/23/2021 continue to feel well.  We have discussed that she is not likely immunosuppressed at this point but that assessing her COVID-19 antibodies would be reasonable.  She also needs adjustment of her Synthroid dosing.    The patient is assessed 8/19/2022 with studies including TSH of 0.067, normal CMP and normal CBC.  She is feeling well  Performance status.  Now at 10 years it is requested that she see primary care in follow-up.      Past Medical History:   Diagnosis Date   • Antiphospholipid antibody syndrome (HCC) 2000   • Endometriosis 2000   • Lymphoma of thyroid gland (HCC)     Stage IE large B cell non-Hodgkin's lymphoma       ONCOLOGIC HISTORY:  (History from previous dates can be found in the separate document.)    Current Outpatient Medications on File Prior to Visit   Medication Sig Dispense Refill   • [DISCONTINUED] levothyroxine (SYNTHROID, LEVOTHROID) 175 MCG tablet Take 1 tablet by mouth Daily. 90 tablet 3   • Cholecalciferol (D3 ADULT PO) Take 2,000 Units by mouth.     • VAGIFEM 10 MCG tablet vaginal tablet USE VAGINALLY 2-3 TIMES PER WEEK  5     No current facility-administered medications on file prior to visit.  "      ALLERGIES:     Allergies   Allergen Reactions   • Morphine And Related        Social History     Socioeconomic History   • Marital status:      Spouse name: Aris   • Years of education: High School   Tobacco Use   • Smoking status: Former Smoker     Packs/day: 1.00     Years: 17.00     Pack years: 17.00     Types: Cigarettes   Substance and Sexual Activity   • Alcohol use: Yes     Comment: Occasional   • Drug use: No         Cancer-related family history includes Cancer in her maternal grandmother and another family member.     Review of Systems   Constitutional: Negative for fatigue.   Respiratory: Negative for chest tightness, shortness of breath and wheezing.    Gastrointestinal: Negative for abdominal pain, constipation, diarrhea, nausea and vomiting.   Neurological: Negative for weakness.     Objective      Vitals:    08/19/22 1413   BP: 121/79   Pulse: 62   Resp: 16   Temp: 98.7 °F (37.1 °C)   TempSrc: Temporal   SpO2: 99%   Weight: 85.9 kg (189 lb 6.4 oz)   Height: 162.6 cm (64\")   PainSc: 0-No pain     Current Status 8/19/2022   ECOG score 0       Physical Exam    GENERAL: Well-developed, well-nourished female in no acute distress.   SKIN: Warm, dry without rashes, purpura or petechiae.   HEAD: Normocephalic.   EYES: Pupils equal, round and reactive to light. EOMs intact. Conjunctivae normal.   EARS: Hearing intact.   NOSE: Septum midline. No excoriations or nasal discharge.   MOUTH: Tongue is well-papillated; no stomatitis or ulcers. Lips normal.   THROAT: Oropharynx without lesions or exudates.   NECK: Supple with good range of motion; no thyromegaly or masses, no JVD or bruits.   LYMPHATICS: No cervical, supraclavicular, axillary or inguinal adenopathy.   CHEST: Lungs clear to percussion and auscultation.   CARDIAC: Regular rate and rhythm without murmurs, rubs or gallops.   ABDOMEN: Soft, nontender with no organomegaly or masses.   EXTREMITIES: No clubbing, cyanosis or edema. "   NEUROLOGICAL: No focal neurological deficits.     RECENT LABS:  Hematology WBC   Date Value Ref Range Status   08/12/2022 5.34 3.40 - 10.80 10*3/mm3 Final     RBC   Date Value Ref Range Status   08/12/2022 4.49 3.77 - 5.28 10*6/mm3 Final     Hemoglobin   Date Value Ref Range Status   08/12/2022 13.2 12.0 - 15.9 g/dL Final     Hematocrit   Date Value Ref Range Status   08/12/2022 40.6 34.0 - 46.6 % Final     Platelets   Date Value Ref Range Status   08/12/2022 256 140 - 450 10*3/mm3 Final        Assessment & Plan   1. This is a 51year-old female with a history of recurrent non-Hodgkin lymphoma, status post therapy with Treanda/Rituxan x5 cycles and radiation therapy followed by observation. At approximately 5 years out from radiation therapy, she remains without evidence recurrent disease.  As she is seen May 9, 2019 8-year 7 she is doing well we will adjust her Synthroid mildly to address her elevated TSH.  The patient is continued monthly assessment including June 24, 2020 and now again 8/23/2021.  Her TSH is mildly elevated and will adjust her Synthroid 175 mcg daily.  Patient assessed 8/19/2022 doing well approximately 10 years out from recurrent disease.  There is no evidence for recurrence of lymphoma though we do plan to see her next year.  Primary care follow-up requested.    2.  Patient urged to maintain COVID-19 vaccinations    3.  1 year follow-up MD, CBC, TSH, CMP

## 2022-08-19 ENCOUNTER — APPOINTMENT (OUTPATIENT)
Dept: LAB | Facility: HOSPITAL | Age: 52
End: 2022-08-19

## 2022-08-19 ENCOUNTER — OFFICE VISIT (OUTPATIENT)
Dept: ONCOLOGY | Facility: CLINIC | Age: 52
End: 2022-08-19

## 2022-08-19 VITALS
DIASTOLIC BLOOD PRESSURE: 79 MMHG | HEIGHT: 64 IN | TEMPERATURE: 98.7 F | WEIGHT: 189.4 LBS | BODY MASS INDEX: 32.33 KG/M2 | HEART RATE: 62 BPM | SYSTOLIC BLOOD PRESSURE: 121 MMHG | OXYGEN SATURATION: 99 % | RESPIRATION RATE: 16 BRPM

## 2022-08-19 DIAGNOSIS — C85.90 NON HODGKIN'S LYMPHOMA, STAGE IA: Primary | ICD-10-CM

## 2022-08-19 PROCEDURE — 99214 OFFICE O/P EST MOD 30 MIN: CPT | Performed by: INTERNAL MEDICINE

## 2022-08-19 RX ORDER — LEVOTHYROXINE SODIUM 0.15 MG/1
150 TABLET ORAL DAILY
Qty: 90 TABLET | Refills: 3 | Status: SHIPPED | OUTPATIENT
Start: 2022-08-19

## 2023-08-11 ENCOUNTER — LAB (OUTPATIENT)
Dept: LAB | Facility: HOSPITAL | Age: 53
End: 2023-08-11
Payer: COMMERCIAL

## 2023-08-11 DIAGNOSIS — C85.90 NON HODGKIN'S LYMPHOMA, STAGE IA: ICD-10-CM

## 2023-08-11 LAB
ALBUMIN SERPL-MCNC: 4.5 G/DL (ref 3.5–5.2)
ALBUMIN/GLOB SERPL: 1.8 G/DL
ALP SERPL-CCNC: 92 U/L (ref 39–117)
ALT SERPL W P-5'-P-CCNC: 30 U/L (ref 1–33)
ANION GAP SERPL CALCULATED.3IONS-SCNC: 12.8 MMOL/L (ref 5–15)
AST SERPL-CCNC: 37 U/L (ref 1–32)
BASOPHILS # BLD AUTO: 0.05 10*3/MM3 (ref 0–0.2)
BASOPHILS NFR BLD AUTO: 0.9 % (ref 0–1.5)
BILIRUB SERPL-MCNC: 0.4 MG/DL (ref 0–1.2)
BUN SERPL-MCNC: 17 MG/DL (ref 6–20)
BUN/CREAT SERPL: 20.2 (ref 7–25)
CALCIUM SPEC-SCNC: 9.7 MG/DL (ref 8.6–10.5)
CHLORIDE SERPL-SCNC: 101 MMOL/L (ref 98–107)
CO2 SERPL-SCNC: 25.2 MMOL/L (ref 22–29)
CREAT SERPL-MCNC: 0.84 MG/DL (ref 0.6–1.1)
DEPRECATED RDW RBC AUTO: 44.8 FL (ref 37–54)
EGFRCR SERPLBLD CKD-EPI 2021: 84.3 ML/MIN/1.73
EOSINOPHIL # BLD AUTO: 0.11 10*3/MM3 (ref 0–0.4)
EOSINOPHIL NFR BLD AUTO: 2 % (ref 0.3–6.2)
ERYTHROCYTE [DISTWIDTH] IN BLOOD BY AUTOMATED COUNT: 13.2 % (ref 12.3–15.4)
GLOBULIN UR ELPH-MCNC: 2.5 GM/DL
GLUCOSE SERPL-MCNC: 88 MG/DL (ref 65–99)
HCT VFR BLD AUTO: 42.3 % (ref 34–46.6)
HGB BLD-MCNC: 13.7 G/DL (ref 12–15.9)
IMM GRANULOCYTES # BLD AUTO: 0.02 10*3/MM3 (ref 0–0.05)
IMM GRANULOCYTES NFR BLD AUTO: 0.4 % (ref 0–0.5)
LYMPHOCYTES # BLD AUTO: 1.54 10*3/MM3 (ref 0.7–3.1)
LYMPHOCYTES NFR BLD AUTO: 27.9 % (ref 19.6–45.3)
MCH RBC QN AUTO: 30 PG (ref 26.6–33)
MCHC RBC AUTO-ENTMCNC: 32.4 G/DL (ref 31.5–35.7)
MCV RBC AUTO: 92.6 FL (ref 79–97)
MONOCYTES # BLD AUTO: 0.49 10*3/MM3 (ref 0.1–0.9)
MONOCYTES NFR BLD AUTO: 8.9 % (ref 5–12)
NEUTROPHILS NFR BLD AUTO: 3.31 10*3/MM3 (ref 1.7–7)
NEUTROPHILS NFR BLD AUTO: 59.9 % (ref 42.7–76)
NRBC BLD AUTO-RTO: 0 /100 WBC (ref 0–0.2)
PLATELET # BLD AUTO: 287 10*3/MM3 (ref 140–450)
PMV BLD AUTO: 9.3 FL (ref 6–12)
POTASSIUM SERPL-SCNC: 4.7 MMOL/L (ref 3.5–5.2)
PROT SERPL-MCNC: 7 G/DL (ref 6–8.5)
RBC # BLD AUTO: 4.57 10*6/MM3 (ref 3.77–5.28)
SODIUM SERPL-SCNC: 139 MMOL/L (ref 136–145)
TSH SERPL DL<=0.05 MIU/L-ACNC: 0.95 UIU/ML (ref 0.27–4.2)
WBC NRBC COR # BLD: 5.52 10*3/MM3 (ref 3.4–10.8)

## 2023-08-11 PROCEDURE — 36415 COLL VENOUS BLD VENIPUNCTURE: CPT

## 2023-08-11 PROCEDURE — 80050 GENERAL HEALTH PANEL: CPT

## 2023-08-18 ENCOUNTER — OFFICE VISIT (OUTPATIENT)
Dept: ONCOLOGY | Facility: CLINIC | Age: 53
End: 2023-08-18
Payer: COMMERCIAL

## 2023-08-18 VITALS
BODY MASS INDEX: 31.96 KG/M2 | SYSTOLIC BLOOD PRESSURE: 133 MMHG | OXYGEN SATURATION: 100 % | TEMPERATURE: 98.2 F | HEIGHT: 64 IN | RESPIRATION RATE: 16 BRPM | DIASTOLIC BLOOD PRESSURE: 81 MMHG | WEIGHT: 187.2 LBS | HEART RATE: 72 BPM

## 2023-08-18 DIAGNOSIS — C85.90 NON HODGKIN'S LYMPHOMA, STAGE IA: Primary | ICD-10-CM

## 2023-08-18 PROCEDURE — 99213 OFFICE O/P EST LOW 20 MIN: CPT | Performed by: INTERNAL MEDICINE

## 2023-08-18 NOTE — PROGRESS NOTES
Subjective .  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   Recurrent disease, involving the left thyroid, biopsy-proven, PET scan reported hypermetabolism with left thyroid gland as well as a right neck lymph node.   Start chemotherapy with Treanda plus Rituxan on 03/21/2011. Had reaction to Rituxan infusion with significant skin rashes.   The [facility], doing well, with additional Treanda/Rituxan chemotherapy already planned.   The patient presents for a second cycle of Treanda/Rituxan with Rituxan moved to third day, followup PET planned.   Patient seen back post [place] review, 4-6 cycles Treanda/Rituxan planned, radiotherapy reconsultation planned, cycle #4 Treanda/Rituxan to be given with Treanda/Rituxan second day provided.   Followup review via [facility] er. Total of 6 cycles of planned Rituxan followed by consultation for radiotherapy and/or surgery. Followup thereafter without transplantation.   Patient seen 12/20/2011 with normal scans, followup reassessments planned. Note - a total of 5 courses of c hemotherapy were given.   Patient with followup studies performed on 04/17/2012; no evidence of recurrent disease.   CT scans 09/11/2012 with slight interval increase in sub-pathologic lymph nodes of the neck; 3-month interval scans planned.   Followup scans on 12/04/2012 showed no evidence of progressive disease, followup assessment in 6 months planned.   Followup scans in June 2013 without evidence of recurrent disease, increasing hot flashes noted, vitamin E trial advocated versus trial of Effexor, followup scans planned at 1 year with a 6-month review as well.   The patient seen 11/20/2013, stable with  evidence of perimenopause. Followup scans scheduled at 6 months, gynecologic assessment pursued.   Patient seen per GYN - postmenopausal status, followup sca ns without evidence of recurrent disease at 3 years, every-6-month followups through 5 years planned.   Patient status post ENT assessment with Dr. Jasen Dela Cruz with new neck node 10/13/2014, needle biopsy inconclusive, node removal consistent with reactive node only, 10/27/2014; assessments planned through our office thereafter.   Patient seen in office 07/08/2015, stable without any evidence of recurrent disease, every-6-month followup planned through year 5.   Patient seen in office 02/17/2016, stable; followup laboratory studies anticipated additional 6 months through the 5-year daisy; thyroid medication refilled; pneumococcal vaccine PPSV23 provided.   Patient seen August 03, 2016, stable clinically, laboratory studies-normal, Prevnar 13 provided  Patient reviewed April 28, 2017, stable without any evidence recurrence, yearly follow-up planned, thyroid medication refilled  Patient reviewed April 30, 2018, excellent performance status  Patient reviewed May 9, 2019 stable, adjustment of Synthroid  Patient reviewed 6/24/2020 doing well  Reassessment 8/23/2021 continue to do well, Synthroid adjustment noted, COVID-19 antibodies obtained, adequate  Reassessment 8/19/2022, studies with normal CBC, CMP, TSH suppressed, thyroid adjusted, primary care follow-up requested      History of Present Illness       The patient is a 52 year-old female with a history of recurrent non-Hodgkin lymphoma, status post therapy with Treanda/Rituxan x5 cycles radiation therapy, followed by observation. As she is seen back at 3 years, followup scans have now been done, which fortunately show no evidence o f recurrent disease. Recent CT of the neck, chest, abdomen and pelvis show a few sub centimeter neck nodes that are non-pathologically enlarged. As the patient is seen back, she  had been last reviewed 11/20/2013, again, generally doing well. She had bee n developing perimenopausal symptoms and was referred back to her gynecologist, Dr. Russo.   As the patient was seen 05/07/2014, she was postmenopausal, handling this well, following with her gynecologist. Plans were made to follow again at a 6-month interv al. We were notified by her ENT physician that she had developed a mid-cervical node 10/13/2014. This had been several cm in size and needle biopsy was done and found to be inconclusive. He contacted our practice and we requested removal of the node. Final path was consistent with reactive findings only.   As the patient returns today, 12/12/2014, she has not had any systemic symptoms and feels well overall, though one remains concerned about recurrent lymphoma.   As the patient was asked to be re-reviewed again, she presents now 07/08/2015. She is doing well without any additional issues since last reviewed.   The patient is now seen back 02/17/2016, feeling well overall. She has had review by ENT when there was thought to be a nodularity in her thyr oid, but this evidently did not turn out to be a serious problem. As she is seen today, she again has excellent performance status and no other new findings. We have discussed that she should obtain a flu shot and reviewed the pneumococcal vaccine as w tanika, hoping to provide PPSV23 here in our office today, and as she returns, Prevnar 13. We are hoping to provide PCV13.   The patient is next seen August 03, 2016.  She is just returned from a [place] trip with her family states that she is felt well with normal stamina, no fevers chills night sweats or other systemic symptoms.  Additionally she's had no development any thrombotic complications.    The patient is next seen April 28, 2017.  She continues to do well overall with excellent performance status.  She's had no additional medical issues since last seen and indicates that she does not  have a primary care physician.  We plan to continue to see her yearly.    The patient is next seen April 30, 2018.  She has done very well over the last year with no additional medical issues.  The patient is next seen May 9, 2019 continue to do well overall.  She has not yet been able to find primary care.  We continue to assess her yearly.     She is next reviewed June 24, 2020 by telephone.  She is not having any additional issues feeling quite well.  Her calcium is elevated slightly secondary to oral supplementation which going to adjust and managed by dietary means.  Her performance status, stamina, work ability have all remained stable to excellent.  This is again the case with the patient is seen 8/23/2021 continue to feel well.  We have discussed that she is not likely immunosuppressed at this point but that assessing her COVID-19 antibodies would be reasonable.  She also needs adjustment of her Synthroid dosing.    The patient is assessed 8/19/2022 with studies including TSH of 0.067, normal CMP and normal CBC.  She is feeling well  Performance status.  Now at 10 years it is requested that she see primary care in follow-up.    The patient is seen 8/18/2023 with repeat studies including CMP and CBC normal, TSH 0.951.  Mammographic screening 10/26/2022 without new abnormality.  The patient is feeling well with an excellent performance status.  We have, again, discussed primary care and she would like to see Dr. Jeramie Boles.      Past Medical History:   Diagnosis Date    Antiphospholipid antibody syndrome 2000    Endometriosis 2000    Lymphoma of thyroid gland     Stage IE large B cell non-Hodgkin's lymphoma       ONCOLOGIC HISTORY:  (History from previous dates can be found in the separate document.)    Current Outpatient Medications on File Prior to Visit   Medication Sig Dispense Refill    Cholecalciferol (D3 ADULT PO) Take 2,000 Units by mouth.      levothyroxine (SYNTHROID, LEVOTHROID) 150 MCG tablet  "Take 1 tablet by mouth Daily. 90 tablet 3    VAGIFEM 10 MCG tablet vaginal tablet USE VAGINALLY 2-3 TIMES PER WEEK  5     No current facility-administered medications on file prior to visit.       ALLERGIES:     Allergies   Allergen Reactions    Morphine And Related        Social History     Socioeconomic History    Marital status:      Spouse name: Aris    Years of education: High School   Tobacco Use    Smoking status: Former     Packs/day: 1.00     Years: 17.00     Pack years: 17.00     Types: Cigarettes   Substance and Sexual Activity    Alcohol use: Yes     Comment: Occasional    Drug use: No         Cancer-related family history includes Cancer in her maternal grandmother and another family member.     Review of Systems   Constitutional:  Negative for fatigue.   Respiratory:  Negative for chest tightness, shortness of breath and wheezing.    Gastrointestinal:  Negative for abdominal pain, constipation, diarrhea, nausea and vomiting.   Neurological:  Negative for weakness.   Objective      Vitals:    08/18/23 1346   BP: 133/81   Pulse: 72   Resp: 16   Temp: 98.2 øF (36.8 øC)   TempSrc: Temporal   SpO2: 100%   Weight: 84.9 kg (187 lb 3.2 oz)   Height: 162.6 cm (64.02\")   PainSc: 0-No pain         8/18/2023     1:47 PM   Current Status   ECOG score 0       Physical Exam    GENERAL: Well-developed, well-nourished female in no acute distress.   SKIN: Warm, dry without rashes, purpura or petechiae.   HEAD: Normocephalic.   EYES: Pupils equal, round and reactive to light. EOMs intact. Conjunctivae normal.   EARS: Hearing intact.   NOSE: Septum midline. No excoriations or nasal discharge.   MOUTH: Tongue is well-papillated; no stomatitis or ulcers. Lips normal.   THROAT: Oropharynx without lesions or exudates.   NECK: Supple with good range of motion; no thyromegaly or masses, no JVD or bruits.   LYMPHATICS: No cervical, supraclavicular, axillary or inguinal adenopathy.   CHEST: Lungs clear to percussion and " auscultation.   CARDIAC: Regular rate and rhythm without murmurs, rubs or gallops.   ABDOMEN: Soft, nontender with no organomegaly or masses.   EXTREMITIES: No clubbing, cyanosis or edema.   NEUROLOGICAL: No focal neurological deficits.     RECENT LABS:  Hematology WBC   Date Value Ref Range Status   08/11/2023 5.52 3.40 - 10.80 10*3/mm3 Final     RBC   Date Value Ref Range Status   08/11/2023 4.57 3.77 - 5.28 10*6/mm3 Final     Hemoglobin   Date Value Ref Range Status   08/11/2023 13.7 12.0 - 15.9 g/dL Final     Hematocrit   Date Value Ref Range Status   08/11/2023 42.3 34.0 - 46.6 % Final     Platelets   Date Value Ref Range Status   08/11/2023 287 140 - 450 10*3/mm3 Final        Assessment & Plan   1. This is a 52 year-old female with a history of recurrent non-Hodgkin lymphoma, status post therapy with Treanda/Rituxan x5 cycles and radiation therapy followed by observation. At approximately 5 years out from radiation therapy, she remains without evidence recurrent disease.  As she is seen May 9, 2019 8-year 7 she is doing well we will adjust her Synthroid mildly to address her elevated TSH.  The patient is continued monthly assessment including June 24, 2020 and now again 8/23/2021.  Her TSH is mildly elevated and will adjust her Synthroid 175 mcg daily.  Patient assessed 8/19/2022 doing well approximately 10 years out from recurrent disease.  There is no evidence for recurrence of lymphoma though we do plan to see her next year.  Primary care follow-up requested.  Patient seen again 8/18/2023, stable clinically, no is recurrent disease, plan for referral to primary care.    2.  Patient urged to maintain COVID-19 vaccinations    3.  1 year follow-up MD, CBC, TSH, CMP

## 2023-08-18 NOTE — LETTER
August 18, 2023     Jeramie Boles DO  [address]  [address]  [facility] [address]    Patient: Katt Germain   YOB: 1970   Date of Visit: 8/18/2023     Dear Jeramie Boles DO:       Thank you for referring Katt Germain to me for evaluation. Below are the relevant portions of my assessment and plan of care.    If you have questions, please do not hesitate to call me. I look forward to following Katt along with you.         Sincerely,        Jasen Borja MD        CC: No Recipients    Jasen Borja MD  08/18/23 1418  Sign when Signing Visit    Subjective .  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   Recurrent disease, involving the left thyroid, biopsy-proven, PET scan reported hypermetabolism with left thyroid gland as well as a right neck lymph node.   Start chemotherapy with Treanda plus Rituxan on 03/21/2011. Had reaction to Rituxan infusion with significant skin rashes.   The [facility], doing well, with additional Treanda/Rituxan chemotherapy already planned.   The patient presents for a second cycle of Treanda/Rituxan with Rituxan moved to third day, followup PET planned.   Patient seen back post [place] review, 4-6 cycles Treanda/Rituxan planned, radiotherapy reconsultation planned, cycle #4 Treanda/Rituxan to be given with Treanda/Rituxan second day provided.   Followup review via [facility] er. Total of 6 cycles of planned Rituxan followed by consultation for radiotherapy and/or surgery. Followup thereafter without transplantation.   Patient seen 12/20/2011 with normal scans, followup reassessments planned. Note - a total of 5 courses of c hemotherapy were given.   Patient  with followup studies performed on 04/17/2012; no evidence of recurrent disease.   CT scans 09/11/2012 with slight interval increase in sub-pathologic lymph nodes of the neck; 3-month interval scans planned.   Followup scans on 12/04/2012 showed no evidence of progressive disease, followup assessment in 6 months planned.   Followup scans in June 2013 without evidence of recurrent disease, increasing hot flashes noted, vitamin E trial advocated versus trial of Effexor, followup scans planned at 1 year with a 6-month review as well.   The patient seen 11/20/2013, stable with evidence of perimenopause. Followup scans scheduled at 6 months, gynecologic assessment pursued.   Patient seen per GYN - postmenopausal status, followup sca ns without evidence of recurrent disease at 3 years, every-6-month followups through 5 years planned.   Patient status post ENT assessment with Dr. Jasen Dela Cruz with new neck node 10/13/2014, needle biopsy inconclusive, node removal consistent with reactive node only, 10/27/2014; assessments planned through our office thereafter.   Patient seen in office 07/08/2015, stable without any evidence of recurrent disease, every-6-month followup planned through year 5.   Patient seen in office 02/17/2016, stable; followup laboratory studies anticipated additional 6 months through the 5-year daisy; thyroid medication refilled; pneumococcal vaccine PPSV23 provided.   Patient seen August 03, 2016, stable clinically, laboratory studies-normal, Prevnar 13 provided  Patient reviewed April 28, 2017, stable without any evidence recurrence, yearly follow-up planned, thyroid medication refilled  Patient reviewed April 30, 2018, excellent performance status  Patient reviewed May 9, 2019 stable, adjustment of Synthroid  Patient reviewed 6/24/2020 doing well  Reassessment 8/23/2021 continue to do well, Synthroid adjustment noted, COVID-19 antibodies obtained, adequate  Reassessment 8/19/2022, studies with normal  CBC, CMP, TSH suppressed, thyroid adjusted, primary care follow-up requested      History of Present Illness       The patient is a 52 year-old female with a history of recurrent non-Hodgkin lymphoma, status post therapy with Treanda/Rituxan x5 cycles radiation therapy, followed by observation. As she is seen back at 3 years, followup scans have now been done, which fortunately show no evidence o f recurrent disease. Recent CT of the neck, chest, abdomen and pelvis show a few sub centimeter neck nodes that are non-pathologically enlarged. As the patient is seen back, she had been last reviewed 11/20/2013, again, generally doing well. She had bee n developing perimenopausal symptoms and was referred back to her gynecologist, Dr. Russo.   As the patient was seen 05/07/2014, she was postmenopausal, handling this well, following with her gynecologist. Plans were made to follow again at a 6-month interv al. We were notified by her ENT physician that she had developed a mid-cervical node 10/13/2014. This had been several cm in size and needle biopsy was done and found to be inconclusive. He contacted our practice and we requested removal of the node. Final path was consistent with reactive findings only.   As the patient returns today, 12/12/2014, she has not had any systemic symptoms and feels well overall, though one remains concerned about recurrent lymphoma.   As the patient was asked to be re-reviewed again, she presents now 07/08/2015. She is doing well without any additional issues since last reviewed.   The patient is now seen back 02/17/2016, feeling well overall. She has had review by ENT when there was thought to be a nodularity in her thyr oid, but this evidently did not turn out to be a serious problem. As she is seen today, she again has excellent performance status and no other new findings. We have discussed that she should obtain a flu shot and reviewed the pneumococcal vaccine as nury sagastume, hoping to provide  PPSV23 here in our office today, and as she returns, Prevnar 13. We are hoping to provide PCV13.   The patient is next seen August 03, 2016.  She is just returned from a [place] trip with her family states that she is felt well with normal stamina, no fevers chills night sweats or other systemic symptoms.  Additionally she's had no development any thrombotic complications.    The patient is next seen April 28, 2017.  She continues to do well overall with excellent performance status.  She's had no additional medical issues since last seen and indicates that she does not have a primary care physician.  We plan to continue to see her yearly.    The patient is next seen April 30, 2018.  She has done very well over the last year with no additional medical issues.  The patient is next seen May 9, 2019 continue to do well overall.  She has not yet been able to find primary care.  We continue to assess her yearly.     She is next reviewed June 24, 2020 by telephone.  She is not having any additional issues feeling quite well.  Her calcium is elevated slightly secondary to oral supplementation which going to adjust and managed by dietary means.  Her performance status, stamina, work ability have all remained stable to excellent.  This is again the case with the patient is seen 8/23/2021 continue to feel well.  We have discussed that she is not likely immunosuppressed at this point but that assessing her COVID-19 antibodies would be reasonable.  She also needs adjustment of her Synthroid dosing.    The patient is assessed 8/19/2022 with studies including TSH of 0.067, normal CMP and normal CBC.  She is feeling well  Performance status.  Now at 10 years it is requested that she see primary care in follow-up.    The patient is seen 8/18/2023 with repeat studies including CMP and CBC normal, TSH 0.951.  Mammographic screening 10/26/2022 without new abnormality.  The patient is feeling well with an excellent performance status.   "We have, again, discussed primary care and she would like to see Dr. Jeramie Boles.      Past Medical History:   Diagnosis Date    Antiphospholipid antibody syndrome 2000    Endometriosis 2000    Lymphoma of thyroid gland     Stage IE large B cell non-Hodgkin's lymphoma       ONCOLOGIC HISTORY:  (History from previous dates can be found in the separate document.)    Current Outpatient Medications on File Prior to Visit   Medication Sig Dispense Refill    Cholecalciferol (D3 ADULT PO) Take 2,000 Units by mouth.      levothyroxine (SYNTHROID, LEVOTHROID) 150 MCG tablet Take 1 tablet by mouth Daily. 90 tablet 3    VAGIFEM 10 MCG tablet vaginal tablet USE VAGINALLY 2-3 TIMES PER WEEK  5     No current facility-administered medications on file prior to visit.       ALLERGIES:     Allergies   Allergen Reactions    Morphine And Related        Social History     Socioeconomic History    Marital status:      Spouse name: Aris    Years of education: High School   Tobacco Use    Smoking status: Former     Packs/day: 1.00     Years: 17.00     Pack years: 17.00     Types: Cigarettes   Substance and Sexual Activity    Alcohol use: Yes     Comment: Occasional    Drug use: No         Cancer-related family history includes Cancer in her maternal grandmother and another family member.     Review of Systems   Constitutional:  Negative for fatigue.   Respiratory:  Negative for chest tightness, shortness of breath and wheezing.    Gastrointestinal:  Negative for abdominal pain, constipation, diarrhea, nausea and vomiting.   Neurological:  Negative for weakness.   Objective      Vitals:    08/18/23 1346   BP: 133/81   Pulse: 72   Resp: 16   Temp: 98.2 øF (36.8 øC)   TempSrc: Temporal   SpO2: 100%   Weight: 84.9 kg (187 lb 3.2 oz)   Height: 162.6 cm (64.02\")   PainSc: 0-No pain         8/18/2023     1:47 PM   Current Status   ECOG score 0       Physical Exam    GENERAL: Well-developed, well-nourished female in no " acute distress.   SKIN: Warm, dry without rashes, purpura or petechiae.   HEAD: Normocephalic.   EYES: Pupils equal, round and reactive to light. EOMs intact. Conjunctivae normal.   EARS: Hearing intact.   NOSE: Septum midline. No excoriations or nasal discharge.   MOUTH: Tongue is well-papillated; no stomatitis or ulcers. Lips normal.   THROAT: Oropharynx without lesions or exudates.   NECK: Supple with good range of motion; no thyromegaly or masses, no JVD or bruits.   LYMPHATICS: No cervical, supraclavicular, axillary or inguinal adenopathy.   CHEST: Lungs clear to percussion and auscultation.   CARDIAC: Regular rate and rhythm without murmurs, rubs or gallops.   ABDOMEN: Soft, nontender with no organomegaly or masses.   EXTREMITIES: No clubbing, cyanosis or edema.   NEUROLOGICAL: No focal neurological deficits.     RECENT LABS:  Hematology WBC   Date Value Ref Range Status   08/11/2023 5.52 3.40 - 10.80 10*3/mm3 Final     RBC   Date Value Ref Range Status   08/11/2023 4.57 3.77 - 5.28 10*6/mm3 Final     Hemoglobin   Date Value Ref Range Status   08/11/2023 13.7 12.0 - 15.9 g/dL Final     Hematocrit   Date Value Ref Range Status   08/11/2023 42.3 34.0 - 46.6 % Final     Platelets   Date Value Ref Range Status   08/11/2023 287 140 - 450 10*3/mm3 Final        Assessment & Plan   1. This is a 52 year-old female with a history of recurrent non-Hodgkin lymphoma, status post therapy with Treanda/Rituxan x5 cycles and radiation therapy followed by observation. At approximately 5 years out from radiation therapy, she remains without evidence recurrent disease.  As she is seen May 9, 2019 8-year 7 she is doing well we will adjust her Synthroid mildly to address her elevated TSH.  The patient is continued monthly assessment including June 24, 2020 and now again 8/23/2021.  Her TSH is mildly elevated and will adjust her Synthroid 175 mcg daily.  Patient assessed 8/19/2022 doing well approximately 10 years out from recurrent  disease.  There is no evidence for recurrence of lymphoma though we do plan to see her next year.  Primary care follow-up requested.  Patient seen again 8/18/2023, stable clinically, no is recurrent disease, plan for referral to primary care.    2.  Patient urged to maintain COVID-19 vaccinations    3.  1 year follow-up MD, CBC, TSH, CMP

## 2024-10-16 ENCOUNTER — LAB (OUTPATIENT)
Dept: LAB | Facility: HOSPITAL | Age: 54
End: 2024-10-16
Payer: COMMERCIAL

## 2024-10-16 DIAGNOSIS — C85.90 NON HODGKIN'S LYMPHOMA, STAGE IA: Primary | ICD-10-CM

## 2024-10-16 DIAGNOSIS — C85.90 NON HODGKIN'S LYMPHOMA, STAGE IA: ICD-10-CM

## 2024-10-16 LAB
ALBUMIN SERPL-MCNC: 4.6 G/DL (ref 3.5–5.2)
ALBUMIN/GLOB SERPL: 1.4 G/DL
ALP SERPL-CCNC: 83 U/L (ref 39–117)
ALT SERPL W P-5'-P-CCNC: 32 U/L (ref 1–33)
ANION GAP SERPL CALCULATED.3IONS-SCNC: 11.2 MMOL/L (ref 5–15)
AST SERPL-CCNC: 41 U/L (ref 1–32)
BASOPHILS # BLD AUTO: 0.06 10*3/MM3 (ref 0–0.2)
BASOPHILS NFR BLD AUTO: 1 % (ref 0–1.5)
BILIRUB SERPL-MCNC: 0.4 MG/DL (ref 0–1.2)
BUN SERPL-MCNC: 20 MG/DL (ref 6–20)
BUN/CREAT SERPL: 22.2 (ref 7–25)
CALCIUM SPEC-SCNC: 9.8 MG/DL (ref 8.6–10.5)
CHLORIDE SERPL-SCNC: 100 MMOL/L (ref 98–107)
CO2 SERPL-SCNC: 27.8 MMOL/L (ref 22–29)
CREAT SERPL-MCNC: 0.9 MG/DL (ref 0.57–1)
DEPRECATED RDW RBC AUTO: 45.9 FL (ref 37–54)
EGFRCR SERPLBLD CKD-EPI 2021: 77.1 ML/MIN/1.73
EOSINOPHIL # BLD AUTO: 0.14 10*3/MM3 (ref 0–0.4)
EOSINOPHIL NFR BLD AUTO: 2.2 % (ref 0.3–6.2)
ERYTHROCYTE [DISTWIDTH] IN BLOOD BY AUTOMATED COUNT: 13.1 % (ref 12.3–15.4)
GLOBULIN UR ELPH-MCNC: 3.3 GM/DL
GLUCOSE SERPL-MCNC: 77 MG/DL (ref 65–99)
HCT VFR BLD AUTO: 45.2 % (ref 34–46.6)
HGB BLD-MCNC: 14.3 G/DL (ref 12–15.9)
IMM GRANULOCYTES # BLD AUTO: 0.02 10*3/MM3 (ref 0–0.05)
IMM GRANULOCYTES NFR BLD AUTO: 0.3 % (ref 0–0.5)
LYMPHOCYTES # BLD AUTO: 1.5 10*3/MM3 (ref 0.7–3.1)
LYMPHOCYTES NFR BLD AUTO: 23.8 % (ref 19.6–45.3)
MCH RBC QN AUTO: 30 PG (ref 26.6–33)
MCHC RBC AUTO-ENTMCNC: 31.6 G/DL (ref 31.5–35.7)
MCV RBC AUTO: 94.8 FL (ref 79–97)
MONOCYTES # BLD AUTO: 0.49 10*3/MM3 (ref 0.1–0.9)
MONOCYTES NFR BLD AUTO: 7.8 % (ref 5–12)
NEUTROPHILS NFR BLD AUTO: 4.09 10*3/MM3 (ref 1.7–7)
NEUTROPHILS NFR BLD AUTO: 64.9 % (ref 42.7–76)
NRBC BLD AUTO-RTO: 0 /100 WBC (ref 0–0.2)
PLATELET # BLD AUTO: 305 10*3/MM3 (ref 140–450)
PMV BLD AUTO: 9.2 FL (ref 6–12)
POTASSIUM SERPL-SCNC: 4.4 MMOL/L (ref 3.5–5.2)
PROT SERPL-MCNC: 7.9 G/DL (ref 6–8.5)
RBC # BLD AUTO: 4.77 10*6/MM3 (ref 3.77–5.28)
SODIUM SERPL-SCNC: 139 MMOL/L (ref 136–145)
TSH SERPL DL<=0.05 MIU/L-ACNC: 1.25 UIU/ML (ref 0.27–4.2)
WBC NRBC COR # BLD AUTO: 6.3 10*3/MM3 (ref 3.4–10.8)

## 2024-10-16 PROCEDURE — 80050 GENERAL HEALTH PANEL: CPT

## 2024-10-16 PROCEDURE — 36415 COLL VENOUS BLD VENIPUNCTURE: CPT

## 2024-10-22 NOTE — PROGRESS NOTES
Subjective patient now seeing primary care on regular basis.      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   Recurrent disease, involving the left thyroid, biopsy-proven, PET scan reported hypermetabolism with left thyroid gland as well as a right neck lymph node.   Start chemotherapy with Treanda plus Rituxan on 03/21/2011. Had reaction to Rituxan infusion with significant skin rashes.   The patient seen [facility] 04/[facility], doing well, with additional Treanda/Rituxan chemotherapy already planned.   The patient presents for a second cycle of Treanda/Rituxan with Rituxan moved to third day, followup PET planned.   Patient seen back post [place] review, 4-6 cycles Treanda/Rituxan planned, radiotherapy reconsultation planned, cycle #4 Treanda/Rituxan to be given with Treanda/Rituxan second day provided.   Followup review via [facility] er. Total of 6 cycles of planned Rituxan followed by consultation for radiotherapy and/or surgery. Followup thereafter without transplantation.   Patient seen 12/20/2011 with normal scans, followup reassessments planned. Note - a total of 5 courses of c hemotherapy were given.   Patient with followup studies performed on 04/17/2012; no evidence of recurrent disease.   CT scans 09/11/2012 with slight interval increase in sub-pathologic lymph nodes of the neck; 3-month interval scans planned.   Followup scans on 12/04/2012 showed no evidence of progressive disease, followup assessment in 6 months planned.   Followup scans in June 2013 without evidence of recurrent disease, increasing hot flashes noted, vitamin E trial advocated versus trial of Effexor, followup scans planned at 1 year with a 6-month review as well.   The patient seen 11/20/2013, stable with evidence of perimenopause. Followup scans scheduled at 6  months, gynecologic assessment pursued.   Patient seen per GYN - postmenopausal status, followup sca ns without evidence of recurrent disease at 3 years, every-6-month followups through 5 years planned.   Patient status post ENT assessment with Dr. Jasen Dela Cruz with new neck node 10/13/2014, needle biopsy inconclusive, node removal consistent with reactive node only, 10/27/2014; assessments planned through our office thereafter.   Patient seen in office 07/08/2015, stable without any evidence of recurrent disease, every-6-month followup planned through year 5.   Patient seen in office 02/17/2016, stable; followup laboratory studies anticipated additional 6 months through the 5-year daisy; thyroid medication refilled; pneumococcal vaccine PPSV23 provided.   Patient seen August 03, 2016, stable clinically, laboratory studies-normal, Prevnar 13 provided  Patient reviewed April 28, 2017, stable without any evidence recurrence, yearly follow-up planned, thyroid medication refilled  Patient reviewed April 30, 2018, excellent performance status  Patient reviewed May 9, 2019 stable, adjustment of Synthroid  Patient reviewed 6/24/2020 doing well  Reassessment 8/23/2021 continue to do well, Synthroid adjustment noted, COVID-19 antibodies obtained, adequate  Reassessment 8/19/2022, studies with normal CBC, CMP, TSH suppressed, thyroid adjusted, primary care follow-up requested  Primary care follow-up established, review 10/23/2024 with stable findings and follow-up in place.      History of Present Illness       The patient is a 53 year-old female with a history of recurrent non-Hodgkin lymphoma, status post therapy with Treanda/Rituxan x5 cycles radiation therapy, followed by observation. As she is seen back at 3 years, followup scans have now been done, which fortunately show no evidence o f recurrent disease. Recent CT of the neck, chest, abdomen and pelvis show a few sub centimeter neck nodes that are non-pathologically  enlarged. As the patient is seen back, she had been last reviewed 11/20/2013, again, generally doing well. She had bee n developing perimenopausal symptoms and was referred back to her gynecologist, Dr. Russo.   As the patient was seen 05/07/2014, she was postmenopausal, handling this well, following with her gynecologist. Plans were made to follow again at a 6-month interv al. We were notified by her ENT physician that she had developed a mid-cervical node 10/13/2014. This had been several cm in size and needle biopsy was done and found to be inconclusive. He contacted our practice and we requested removal of the node. Final path was consistent with reactive findings only.   As the patient returns today, 12/12/2014, she has not had any systemic symptoms and feels well overall, though one remains concerned about recurrent lymphoma.   As the patient was asked to be re-reviewed again, she presents now 07/08/2015. She is doing well without any additional issues since last reviewed.   The patient is now seen back 02/17/2016, feeling well overall. She has had review by ENT when there was thought to be a nodularity in her thyr oid, but this evidently did not turn out to be a serious problem. As she is seen today, she again has excellent performance status and no other new findings. We have discussed that she should obtain a flu shot and reviewed the pneumococcal vaccine as w tanika, hoping to provide PPSV23 here in our office today, and as she returns, Prevnar 13. We are hoping to provide PCV13.   The patient is next seen August 03, 2016.  She is just returned from a [place] trip with her family states that she is felt well with normal stamina, no fevers chills night sweats or other systemic symptoms.  Additionally she's had no development any thrombotic complications.    The patient is next seen April 28, 2017.  She continues to do well overall with excellent performance status.  She's had no additional medical issues since  last seen and indicates that she does not have a primary care physician.  We plan to continue to see her yearly.    The patient is next seen April 30, 2018.  She has done very well over the last year with no additional medical issues.  The patient is next seen May 9, 2019 continue to do well overall.  She has not yet been able to find primary care.  We continue to assess her yearly.     She is next reviewed June 24, 2020 by telephone.  She is not having any additional issues feeling quite well.  Her calcium is elevated slightly secondary to oral supplementation which going to adjust and managed by dietary means.  Her performance status, stamina, work ability have all remained stable to excellent.  This is again the case with the patient is seen 8/23/2021 continue to feel well.  We have discussed that she is not likely immunosuppressed at this point but that assessing her COVID-19 antibodies would be reasonable.  She also needs adjustment of her Synthroid dosing.    The patient is assessed 8/19/2022 with studies including TSH of 0.067, normal CMP and normal CBC.  She is feeling well  Performance status.  Now at 10 years it is requested that she see primary care in follow-up.    The patient is seen 8/18/2023 with repeat studies including CMP and CBC normal, TSH 0.951.  Mammographic screening 10/26/2022 without new abnormality.  The patient is feeling well with an excellent performance status.  We have, again, discussed primary care and she would like to see Dr. Jeramie Boles.    The patient is now seen 10/23/2024 having been seen by CARLA Roman establishing care 10/23/2024.  Record is reviewed and patient is pleased about recent assessments and following recommendations.  Performance status remains excellent.  Laboratory studies reviewed as well as recent imaging.      Past Medical History:   Diagnosis Date    Antiphospholipid antibody syndrome 2000    Endometriosis 2000    Lymphoma of thyroid gland      "Stage IE large B cell non-Hodgkin's lymphoma       ONCOLOGIC HISTORY:  (History from previous dates can be found in the separate document.)    Current Outpatient Medications on File Prior to Visit   Medication Sig Dispense Refill    ascorbic acid (VITAMIN C) 250 MG tablet Take 1 tablet by mouth Daily.      Cholecalciferol (D3 ADULT PO) Take 2,000 Units by mouth.      levothyroxine (SYNTHROID, LEVOTHROID) 150 MCG tablet Take 1 tablet by mouth Daily. 90 tablet 3    multivitamin (THERAGRAN) tablet tablet Take  by mouth.      VAGIFEM 10 MCG tablet vaginal tablet USE VAGINALLY 2-3 TIMES PER WEEK  5    Zinc Sulfate 66 MG tablet Take  by mouth.       No current facility-administered medications on file prior to visit.       ALLERGIES:     Allergies   Allergen Reactions    Morphine And Codeine        Social History     Socioeconomic History    Marital status:      Spouse name: Aris    Years of education: High School   Tobacco Use    Smoking status: Former     Current packs/day: 1.00     Average packs/day: 1 pack/day for 17.0 years (17.0 ttl pk-yrs)     Types: Cigarettes   Substance and Sexual Activity    Alcohol use: Yes     Comment: Occasional    Drug use: No         Cancer-related family history includes Cancer in her maternal grandmother and another family member.     Review of Systems   Constitutional:  Negative for fatigue.   Respiratory:  Negative for chest tightness, shortness of breath and wheezing.    Gastrointestinal:  Negative for abdominal pain, constipation, diarrhea, nausea and vomiting.   Neurological:  Negative for weakness.     Objective      Vitals:    10/23/24 1326   BP: 125/78   Pulse: 63   Resp: 16   Temp: 98 °F (36.7 °C)   TempSrc: Oral   SpO2: 98%   Weight: 86.1 kg (189 lb 12.8 oz)   Height: 165.1 cm (65\")   PainSc: 0-No pain           10/23/2024     1:24 PM   Current Status   ECOG score 0       Physical Exam    GENERAL: Well-developed, well-nourished female in no acute distress.   SKIN: " Warm, dry without rashes, purpura or petechiae.   HEAD: Normocephalic.   EYES: Pupils equal, round and reactive to light. EOMs intact. Conjunctivae normal.   EARS: Hearing intact.   NOSE: Septum midline. No excoriations or nasal discharge.   MOUTH: Tongue is well-papillated; no stomatitis or ulcers. Lips normal.   THROAT: Oropharynx without lesions or exudates.   NECK: Supple with good range of motion; no thyromegaly or masses, no JVD or bruits.   LYMPHATICS: No cervical, supraclavicular, axillary or inguinal adenopathy.   CHEST: Lungs clear to percussion and auscultation.   CARDIAC: Regular rate and rhythm without murmurs, rubs or gallops.   ABDOMEN: Soft, nontender with no organomegaly or masses.   EXTREMITIES: No clubbing, cyanosis or edema.   NEUROLOGICAL: No focal neurological deficits.     RECENT LABS:  Hematology WBC   Date Value Ref Range Status   10/16/2024 6.30 3.40 - 10.80 10*3/mm3 Final   09/27/2024 5.35 4.5 - 11.0 10*3/uL Final     RBC   Date Value Ref Range Status   10/16/2024 4.77 3.77 - 5.28 10*6/mm3 Final   09/27/2024 4.56 4.0 - 5.2 10*6/uL Final     Hemoglobin   Date Value Ref Range Status   10/16/2024 14.3 12.0 - 15.9 g/dL Final   09/27/2024 13.3 12.0 - 16.0 g/dL Final     Hematocrit   Date Value Ref Range Status   10/16/2024 45.2 34.0 - 46.6 % Final   09/27/2024 43.4 36.0 - 46.0 % Final     Platelets   Date Value Ref Range Status   10/16/2024 305 140 - 450 10*3/mm3 Final   09/27/2024 310 140 - 440 10*3/uL Final        Assessment & Plan   1. This is a 53 year-old female with a history of recurrent non-Hodgkin lymphoma, status post therapy with Treanda/Rituxan x5 cycles and radiation therapy followed by observation. At approximately 5 years out from radiation therapy, she remains without evidence recurrent disease.  As she is seen May 9, 2019 8-year 7 she is doing well we will adjust her Synthroid mildly to address her elevated TSH.  The patient is continued monthly assessment including June 24,  2020 and now again 8/23/2021.  Her TSH is mildly elevated and will adjust her Synthroid 175 mcg daily.  Patient assessed 8/19/2022 doing well approximately 10 years out from recurrent disease.  There is no evidence for recurrence of lymphoma though we do plan to see her next year.  Primary care follow-up requested.  Patient seen again 8/18/2023, stable clinically, no is recurrent disease, plan for referral to primary care.  Patient assessed by primary care 9/27/2024, generally doing well with follow-up scheduling per metabolic's, mammography and routine care.    2.  Patient urged to maintain COVID-19 vaccinations    3.  The patient can be discharged from [facility] office to be seen on a as needed basis.  Thank you very much for allowing us to work with you in the patient's care and please let me know any questions concerning her status.

## 2024-10-23 ENCOUNTER — OFFICE VISIT (OUTPATIENT)
Dept: ONCOLOGY | Facility: CLINIC | Age: 54
End: 2024-10-23
Payer: COMMERCIAL

## 2024-10-23 VITALS
OXYGEN SATURATION: 98 % | HEART RATE: 63 BPM | HEIGHT: 65 IN | WEIGHT: 189.8 LBS | DIASTOLIC BLOOD PRESSURE: 78 MMHG | TEMPERATURE: 98 F | SYSTOLIC BLOOD PRESSURE: 125 MMHG | RESPIRATION RATE: 16 BRPM | BODY MASS INDEX: 31.62 KG/M2

## 2024-10-23 DIAGNOSIS — C85.90 NON HODGKIN'S LYMPHOMA, STAGE IA: Primary | ICD-10-CM

## 2024-10-23 PROCEDURE — 99213 OFFICE O/P EST LOW 20 MIN: CPT | Performed by: INTERNAL MEDICINE

## 2024-10-23 RX ORDER — DIPHENOXYLATE HYDROCHLORIDE AND ATROPINE SULFATE 2.5; .025 MG/1; MG/1
TABLET ORAL
COMMUNITY

## 2024-10-23 NOTE — LETTER
October 23, 2024     CARLA Schrader  [address]  [address]  [facility] [address]    Patient: Katt Germain   YOB: 1970   Date of Visit: 10/23/2024     Dear CARLA Schrader:       Thank you for referring Katt Germain to me for evaluation. Below are the relevant portions of my assessment and plan of care.    If you have questions, please do not hesitate to call me. I look forward to following Katt along with you.         Sincerely,        Jasen Borja MD        CC: No Recipients    Jasen Borja MD  10/23/24 3519  Sign when Signing Visit    Subjectivepatient now seeing primary care on regular basis.      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   Recurrent disease, involving the left thyroid, biopsy-proven, PET scan reported hypermetabolism with left thyroid gland as well as a right neck lymph node.   Start chemotherapy with Treanda plus Rituxan on 03/21/2011. Had reaction to Rituxan infusion with significant skin rashes.   The patient [facility] 04/01/[facility], doing well, with additional Treanda/Rituxan chemotherapy already planned.   The patient presents for a second cycle of Treanda/Rituxan with Rituxan moved to third day, followup PET planned.   Patient seen back post [place] review, 4-6 cycles Treanda/Rituxan planned, radiotherapy reconsultation planned, cycle #4 Treanda/Rituxan to be given with Treanda/Rituxan second day provided.   Followup review via [facility] er. Total of 6 cycles of planned Rituxan followed by consultation for radiotherapy and/or surgery. Followup thereafter without transplantation.   Patient seen 12/20/2011 with normal scans, followup reassessments planned. Note - a total of 5 courses of c hemotherapy were given.   Patient with followup studies performed on 04/17/2012; no  evidence of recurrent disease.   CT scans 09/11/2012 with slight interval increase in sub-pathologic lymph nodes of the neck; 3-month interval scans planned.   Followup scans on 12/04/2012 showed no evidence of progressive disease, followup assessment in 6 months planned.   Followup scans in June 2013 without evidence of recurrent disease, increasing hot flashes noted, vitamin E trial advocated versus trial of Effexor, followup scans planned at 1 year with a 6-month review as well.   The patient seen 11/20/2013, stable with evidence of perimenopause. Followup scans scheduled at 6 months, gynecologic assessment pursued.   Patient seen per GYN - postmenopausal status, followup sca ns without evidence of recurrent disease at 3 years, every-6-month followups through 5 years planned.   Patient status post ENT assessment with Dr. Jasen Dela Cruz with new neck node 10/13/2014, needle biopsy inconclusive, node removal consistent with reactive node only, 10/27/2014; assessments planned through our office thereafter.   Patient seen in office 07/08/2015, stable without any evidence of recurrent disease, every-6-month followup planned through year 5.   Patient seen in office 02/17/2016, stable; followup laboratory studies anticipated additional 6 months through the 5-year daisy; thyroid medication refilled; pneumococcal vaccine PPSV23 provided.   Patient seen August 03, 2016, stable clinically, laboratory studies-normal, Prevnar 13 provided  Patient reviewed April 28, 2017, stable without any evidence recurrence, yearly follow-up planned, thyroid medication refilled  Patient reviewed April 30, 2018, excellent performance status  Patient reviewed May 9, 2019 stable, adjustment of Synthroid  Patient reviewed 6/24/2020 doing well  Reassessment 8/23/2021 continue to do well, Synthroid adjustment noted, COVID-19 antibodies obtained, adequate  Reassessment 8/19/2022, studies with normal CBC, CMP, TSH suppressed, thyroid adjusted,  primary care follow-up requested  Primary care follow-up established, review 10/23/2024 with stable findings and follow-up in place.      History of Present Illness       The patient is a 53 year-old female with a history of recurrent non-Hodgkin lymphoma, status post therapy with Treanda/Rituxan x5 cycles radiation therapy, followed by observation. As she is seen back at 3 years, followup scans have now been done, which fortunately show no evidence o f recurrent disease. Recent CT of the neck, chest, abdomen and pelvis show a few sub centimeter neck nodes that are non-pathologically enlarged. As the patient is seen back, she had been last reviewed 11/20/2013, again, generally doing well. She had bee n developing perimenopausal symptoms and was referred back to her gynecologist, Dr. Russo.   As the patient was seen 05/07/2014, she was postmenopausal, handling this well, following with her gynecologist. Plans were made to follow again at a 6-month interv al. We were notified by her ENT physician that she had developed a mid-cervical node 10/13/2014. This had been several cm in size and needle biopsy was done and found to be inconclusive. He contacted our practice and we requested removal of the node. Final path was consistent with reactive findings only.   As the patient returns today, 12/12/2014, she has not had any systemic symptoms and feels well overall, though one remains concerned about recurrent lymphoma.   As the patient was asked to be re-reviewed again, she presents now 07/08/2015. She is doing well without any additional issues since last reviewed.   The patient is now seen back 02/17/2016, feeling well overall. She has had review by ENT when there was thought to be a nodularity in her thyr oid, but this evidently did not turn out to be a serious problem. As she is seen today, she again has excellent performance status and no other new findings. We have discussed that she should obtain a flu shot and  reviewed the pneumococcal vaccine as nuyr sagastume, hoping to provide PPSV23 here in our office today, and as she returns, Prevnar 13. We are hoping to provide PCV13.   The patient is next seen August 03, 2016.  She is just returned from a [place] trip with her family states that she is felt well with normal stamina, no fevers chills night sweats or other systemic symptoms.  Additionally she's had no development any thrombotic complications.    The patient is next seen April 28, 2017.  She continues to do well overall with excellent performance status.  She's had no additional medical issues since last seen and indicates that she does not have a primary care physician.  We plan to continue to see her yearly.    The patient is next seen April 30, 2018.  She has done very well over the last year with no additional medical issues.  The patient is next seen May 9, 2019 continue to do well overall.  She has not yet been able to find primary care.  We continue to assess her yearly.     She is next reviewed June 24, 2020 by telephone.  She is not having any additional issues feeling quite well.  Her calcium is elevated slightly secondary to oral supplementation which going to adjust and managed by dietary means.  Her performance status, stamina, work ability have all remained stable to excellent.  This is again the case with the patient is seen 8/23/2021 continue to feel well.  We have discussed that she is not likely immunosuppressed at this point but that assessing her COVID-19 antibodies would be reasonable.  She also needs adjustment of her Synthroid dosing.    The patient is assessed 8/19/2022 with studies including TSH of 0.067, normal CMP and normal CBC.  She is feeling well  Performance status.  Now at 10 years it is requested that she see primary care in follow-up.    The patient is seen 8/18/2023 with repeat studies including CMP and CBC normal, TSH 0.951.  Mammographic screening 10/26/2022 without new abnormality.  The  patient is feeling well with an excellent performance status.  We have, again, discussed primary care and she would like to see Dr. Jeramie Boles.    The patient is now seen 10/23/2024 having been seen by CARLA Roman establishing care 10/23/2024.  Record is reviewed and patient is pleased about recent assessments and following recommendations.  Performance status remains excellent.  Laboratory studies reviewed as well as recent imaging.      Past Medical History:   Diagnosis Date   • Antiphospholipid antibody syndrome 2000   • Endometriosis 2000   • Lymphoma of thyroid gland     Stage IE large B cell non-Hodgkin's lymphoma       ONCOLOGIC HISTORY:  (History from previous dates can be found in the separate document.)    Current Outpatient Medications on File Prior to Visit   Medication Sig Dispense Refill   • ascorbic acid (VITAMIN C) 250 MG tablet Take 1 tablet by mouth Daily.     • Cholecalciferol (D3 ADULT PO) Take 2,000 Units by mouth.     • levothyroxine (SYNTHROID, LEVOTHROID) 150 MCG tablet Take 1 tablet by mouth Daily. 90 tablet 3   • multivitamin (THERAGRAN) tablet tablet Take  by mouth.     • VAGIFEM 10 MCG tablet vaginal tablet USE VAGINALLY 2-3 TIMES PER WEEK  5   • Zinc Sulfate 66 MG tablet Take  by mouth.       No current facility-administered medications on file prior to visit.       ALLERGIES:     Allergies   Allergen Reactions   • Morphine And Codeine        Social History     Socioeconomic History   • Marital status:      Spouse name: Aris   • Years of education: High School   Tobacco Use   • Smoking status: Former     Current packs/day: 1.00     Average packs/day: 1 pack/day for 17.0 years (17.0 ttl pk-yrs)     Types: Cigarettes   Substance and Sexual Activity   • Alcohol use: Yes     Comment: Occasional   • Drug use: No         Cancer-related family history includes Cancer in her maternal grandmother and another family member.     Review of Systems   Constitutional:  Negative  "for fatigue.   Respiratory:  Negative for chest tightness, shortness of breath and wheezing.    Gastrointestinal:  Negative for abdominal pain, constipation, diarrhea, nausea and vomiting.   Neurological:  Negative for weakness.     Objective     Vitals:    10/23/24 1326   BP: 125/78   Pulse: 63   Resp: 16   Temp: 98 °F (36.7 °C)   TempSrc: Oral   SpO2: 98%   Weight: 86.1 kg (189 lb 12.8 oz)   Height: 165.1 cm (65\")   PainSc: 0-No pain           10/23/2024     1:24 PM   Current Status   ECOG score 0       Physical Exam    GENERAL: Well-developed, well-nourished female in no acute distress.   SKIN: Warm, dry without rashes, purpura or petechiae.   HEAD: Normocephalic.   EYES: Pupils equal, round and reactive to light. EOMs intact. Conjunctivae normal.   EARS: Hearing intact.   NOSE: Septum midline. No excoriations or nasal discharge.   MOUTH: Tongue is well-papillated; no stomatitis or ulcers. Lips normal.   THROAT: Oropharynx without lesions or exudates.   NECK: Supple with good range of motion; no thyromegaly or masses, no JVD or bruits.   LYMPHATICS: No cervical, supraclavicular, axillary or inguinal adenopathy.   CHEST: Lungs clear to percussion and auscultation.   CARDIAC: Regular rate and rhythm without murmurs, rubs or gallops.   ABDOMEN: Soft, nontender with no organomegaly or masses.   EXTREMITIES: No clubbing, cyanosis or edema.   NEUROLOGICAL: No focal neurological deficits.     RECENT LABS:  Hematology WBC   Date Value Ref Range Status   10/16/2024 6.30 3.40 - 10.80 10*3/mm3 Final   09/27/2024 5.35 4.5 - 11.0 10*3/uL Final     RBC   Date Value Ref Range Status   10/16/2024 4.77 3.77 - 5.28 10*6/mm3 Final   09/27/2024 4.56 4.0 - 5.2 10*6/uL Final     Hemoglobin   Date Value Ref Range Status   10/16/2024 14.3 12.0 - 15.9 g/dL Final   09/27/2024 13.3 12.0 - 16.0 g/dL Final     Hematocrit   Date Value Ref Range Status   10/16/2024 45.2 34.0 - 46.6 % Final   09/27/2024 43.4 36.0 - 46.0 % Final     Platelets "   Date Value Ref Range Status   10/16/2024 305 140 - 450 10*3/mm3 Final   09/27/2024 310 140 - 440 10*3/uL Final        Assessment & Plan  1. This is a 53 year-old female with a history of recurrent non-Hodgkin lymphoma, status post therapy with Treanda/Rituxan x5 cycles and radiation therapy followed by observation. At approximately 5 years out from radiation therapy, she remains without evidence recurrent disease.  As she is seen May 9, 2019 8-year 7 she is doing well we will adjust her Synthroid mildly to address her elevated TSH.  The patient is continued monthly assessment including June 24, 2020 and now again 8/23/2021.  Her TSH is mildly elevated and will adjust her Synthroid 175 mcg daily.  Patient assessed 8/19/2022 doing well approximately 10 years out from recurrent disease.  There is no evidence for recurrence of lymphoma though we do plan to see her next year.  Primary care follow-up requested.  Patient seen again 8/18/2023, stable clinically, no is recurrent disease, plan for referral to primary care.  Patient assessed by primary care 9/27/2024, generally doing well with follow-up scheduling per metabolic's, mammography and routine care.    2.  Patient urged to maintain COVID-19 vaccinations    3.  The patient can be discharged from [facility] office to be seen on a as needed basis.  Thank you very much for allowing us to work with you in the patient's care and please let me know any questions concerning her status.